# Patient Record
Sex: MALE | Race: WHITE | NOT HISPANIC OR LATINO
[De-identification: names, ages, dates, MRNs, and addresses within clinical notes are randomized per-mention and may not be internally consistent; named-entity substitution may affect disease eponyms.]

---

## 2017-03-08 ENCOUNTER — APPOINTMENT (OUTPATIENT)
Dept: INTERNAL MEDICINE | Facility: CLINIC | Age: 47
End: 2017-03-08

## 2017-03-08 VITALS — SYSTOLIC BLOOD PRESSURE: 130 MMHG | DIASTOLIC BLOOD PRESSURE: 88 MMHG

## 2017-03-08 VITALS
HEIGHT: 72 IN | OXYGEN SATURATION: 96 % | DIASTOLIC BLOOD PRESSURE: 90 MMHG | HEART RATE: 75 BPM | RESPIRATION RATE: 14 BRPM | BODY MASS INDEX: 42.66 KG/M2 | WEIGHT: 315 LBS | SYSTOLIC BLOOD PRESSURE: 130 MMHG

## 2017-03-10 ENCOUNTER — RX RENEWAL (OUTPATIENT)
Age: 47
End: 2017-03-10

## 2017-03-10 DIAGNOSIS — E55.9 VITAMIN D DEFICIENCY, UNSPECIFIED: ICD-10-CM

## 2017-03-10 DIAGNOSIS — N40.0 BENIGN PROSTATIC HYPERPLASIA WITHOUT LOWER URINARY TRACT SYMPMS: ICD-10-CM

## 2017-03-10 LAB
25(OH)D3 SERPL-MCNC: 14.5 NG/ML
ALBUMIN SERPL ELPH-MCNC: 4.1 G/DL
ALP BLD-CCNC: 65 U/L
ALT SERPL-CCNC: 35 U/L
ANION GAP SERPL CALC-SCNC: 17 MMOL/L
AST SERPL-CCNC: 25 U/L
BASOPHILS # BLD AUTO: 0.05 K/UL
BASOPHILS NFR BLD AUTO: 0.4 %
BILIRUB SERPL-MCNC: 0.9 MG/DL
BUN SERPL-MCNC: 28 MG/DL
CALCIUM SERPL-MCNC: 9.4 MG/DL
CHLORIDE SERPL-SCNC: 97 MMOL/L
CHOLEST SERPL-MCNC: 238 MG/DL
CHOLEST/HDLC SERPL: 5.4 RATIO
CK SERPL-CCNC: 184 U/L
CO2 SERPL-SCNC: 28 MMOL/L
CREAT SERPL-MCNC: 1.47 MG/DL
EOSINOPHIL # BLD AUTO: 0.28 K/UL
EOSINOPHIL NFR BLD AUTO: 2.5 %
GLUCOSE SERPL-MCNC: 84 MG/DL
HBA1C MFR BLD HPLC: 5.6 %
HCT VFR BLD CALC: 46.3 %
HDLC SERPL-MCNC: 44 MG/DL
HGB BLD-MCNC: 15.4 G/DL
IMM GRANULOCYTES NFR BLD AUTO: 0.2 %
LDLC SERPL CALC-MCNC: 160 MG/DL
LYMPHOCYTES # BLD AUTO: 3.01 K/UL
LYMPHOCYTES NFR BLD AUTO: 26.6 %
MAN DIFF?: NORMAL
MCHC RBC-ENTMCNC: 29.2 PG
MCHC RBC-ENTMCNC: 33.3 GM/DL
MCV RBC AUTO: 87.9 FL
MONOCYTES # BLD AUTO: 0.78 K/UL
MONOCYTES NFR BLD AUTO: 6.9 %
NEUTROPHILS # BLD AUTO: 7.16 K/UL
NEUTROPHILS NFR BLD AUTO: 63.4 %
PLATELET # BLD AUTO: 280 K/UL
POTASSIUM SERPL-SCNC: 3.8 MMOL/L
PROT SERPL-MCNC: 7 G/DL
PSA SERPL-MCNC: 2.13 NG/ML
RBC # BLD: 5.27 M/UL
RBC # FLD: 13.6 %
SODIUM SERPL-SCNC: 142 MMOL/L
TRIGL SERPL-MCNC: 168 MG/DL
TSH SERPL-ACNC: 3.78 UIU/ML
WBC # FLD AUTO: 11.3 K/UL

## 2017-04-26 ENCOUNTER — MEDICATION RENEWAL (OUTPATIENT)
Age: 47
End: 2017-04-26

## 2017-06-15 ENCOUNTER — APPOINTMENT (OUTPATIENT)
Dept: INTERNAL MEDICINE | Facility: CLINIC | Age: 47
End: 2017-06-15

## 2017-06-15 VITALS
HEIGHT: 72 IN | BODY MASS INDEX: 42.66 KG/M2 | TEMPERATURE: 98.6 F | DIASTOLIC BLOOD PRESSURE: 70 MMHG | WEIGHT: 315 LBS | SYSTOLIC BLOOD PRESSURE: 130 MMHG | OXYGEN SATURATION: 98 % | HEART RATE: 77 BPM | RESPIRATION RATE: 14 BRPM

## 2017-06-15 DIAGNOSIS — N52.9 MALE ERECTILE DYSFUNCTION, UNSPECIFIED: ICD-10-CM

## 2017-06-15 RX ORDER — AZITHROMYCIN 250 MG/1
250 TABLET, FILM COATED ORAL
Qty: 6 | Refills: 0 | Status: DISCONTINUED | COMMUNITY
Start: 2017-04-15

## 2017-11-09 ENCOUNTER — RX RENEWAL (OUTPATIENT)
Age: 47
End: 2017-11-09

## 2017-11-20 ENCOUNTER — RX RENEWAL (OUTPATIENT)
Age: 47
End: 2017-11-20

## 2018-01-17 ENCOUNTER — APPOINTMENT (OUTPATIENT)
Dept: INTERNAL MEDICINE | Facility: CLINIC | Age: 48
End: 2018-01-17
Payer: SELF-PAY

## 2018-01-17 VITALS
DIASTOLIC BLOOD PRESSURE: 70 MMHG | HEART RATE: 79 BPM | SYSTOLIC BLOOD PRESSURE: 138 MMHG | TEMPERATURE: 98.4 F | OXYGEN SATURATION: 96 % | RESPIRATION RATE: 14 BRPM | HEIGHT: 72 IN

## 2018-01-17 LAB
BASOPHILS # BLD AUTO: 0.06 K/UL
BASOPHILS NFR BLD AUTO: 0.6 %
EOSINOPHIL # BLD AUTO: 0.4 K/UL
EOSINOPHIL NFR BLD AUTO: 3.9 %
HCT VFR BLD CALC: 48.1 %
HGB BLD-MCNC: 16.3 G/DL
IMM GRANULOCYTES NFR BLD AUTO: 0.4 %
LYMPHOCYTES # BLD AUTO: 2.71 K/UL
LYMPHOCYTES NFR BLD AUTO: 26.3 %
MAN DIFF?: NORMAL
MCHC RBC-ENTMCNC: 29.5 PG
MCHC RBC-ENTMCNC: 33.9 GM/DL
MCV RBC AUTO: 87.1 FL
MONOCYTES # BLD AUTO: 0.64 K/UL
MONOCYTES NFR BLD AUTO: 6.2 %
NEUTROPHILS # BLD AUTO: 6.46 K/UL
NEUTROPHILS NFR BLD AUTO: 62.6 %
PLATELET # BLD AUTO: 242 K/UL
RBC # BLD: 5.52 M/UL
RBC # FLD: 13.4 %
WBC # FLD AUTO: 10.31 K/UL

## 2018-01-17 PROCEDURE — 99214 OFFICE O/P EST MOD 30 MIN: CPT | Mod: 25

## 2018-01-17 PROCEDURE — 36415 COLL VENOUS BLD VENIPUNCTURE: CPT

## 2018-01-24 LAB
25(OH)D3 SERPL-MCNC: 24.1 NG/ML
ALBUMIN SERPL ELPH-MCNC: 3.9 G/DL
ALP BLD-CCNC: 70 U/L
ALT SERPL-CCNC: 26 U/L
ANION GAP SERPL CALC-SCNC: 15 MMOL/L
AST SERPL-CCNC: 26 U/L
BILIRUB SERPL-MCNC: 0.8 MG/DL
BUN SERPL-MCNC: 23 MG/DL
CALCIUM SERPL-MCNC: 9.9 MG/DL
CHLORIDE SERPL-SCNC: 100 MMOL/L
CHOLEST SERPL-MCNC: 253 MG/DL
CHOLEST/HDLC SERPL: 5.1 RATIO
CK SERPL-CCNC: 157 U/L
CO2 SERPL-SCNC: 27 MMOL/L
CREAT SERPL-MCNC: 1.44 MG/DL
GLUCOSE SERPL-MCNC: 98 MG/DL
HBA1C MFR BLD HPLC: 5.4 %
HDLC SERPL-MCNC: 50 MG/DL
LDLC SERPL CALC-MCNC: 174 MG/DL
POTASSIUM SERPL-SCNC: 3.9 MMOL/L
PROT SERPL-MCNC: 8 G/DL
PSA SERPL-MCNC: 2.08 NG/ML
SODIUM SERPL-SCNC: 142 MMOL/L
TRIGL SERPL-MCNC: 144 MG/DL
TSH SERPL-ACNC: 3.45 UIU/ML
URATE SERPL-MCNC: 8.9 MG/DL

## 2018-10-21 ENCOUNTER — RX RENEWAL (OUTPATIENT)
Age: 48
End: 2018-10-21

## 2018-10-31 ENCOUNTER — RX RENEWAL (OUTPATIENT)
Age: 48
End: 2018-10-31

## 2018-11-08 ENCOUNTER — EMERGENCY (EMERGENCY)
Facility: HOSPITAL | Age: 48
LOS: 1 days | Discharge: HOME | End: 2018-11-08
Attending: EMERGENCY MEDICINE
Payer: COMMERCIAL

## 2018-11-08 ENCOUNTER — APPOINTMENT (OUTPATIENT)
Dept: INTERNAL MEDICINE | Facility: CLINIC | Age: 48
End: 2018-11-08
Payer: COMMERCIAL

## 2018-11-08 VITALS
SYSTOLIC BLOOD PRESSURE: 144 MMHG | RESPIRATION RATE: 16 BRPM | TEMPERATURE: 97.9 F | HEART RATE: 82 BPM | OXYGEN SATURATION: 96 % | DIASTOLIC BLOOD PRESSURE: 86 MMHG

## 2018-11-08 VITALS
DIASTOLIC BLOOD PRESSURE: 83 MMHG | OXYGEN SATURATION: 98 % | RESPIRATION RATE: 15 BRPM | SYSTOLIC BLOOD PRESSURE: 119 MMHG | TEMPERATURE: 98 F | HEART RATE: 70 BPM

## 2018-11-08 VITALS
RESPIRATION RATE: 16 BRPM | SYSTOLIC BLOOD PRESSURE: 112 MMHG | DIASTOLIC BLOOD PRESSURE: 80 MMHG | HEART RATE: 75 BPM | HEIGHT: 72 IN | OXYGEN SATURATION: 96 % | WEIGHT: 315 LBS | TEMPERATURE: 98 F

## 2018-11-08 DIAGNOSIS — Z98.89 OTHER SPECIFIED POSTPROCEDURAL STATES: Chronic | ICD-10-CM

## 2018-11-08 DIAGNOSIS — R20.0 ANESTHESIA OF SKIN: ICD-10-CM

## 2018-11-08 DIAGNOSIS — R29.898 OTHER SYMPTOMS AND SIGNS INVOLVING THE MUSCULOSKELETAL SYSTEM: ICD-10-CM

## 2018-11-08 PROCEDURE — 99214 OFFICE O/P EST MOD 30 MIN: CPT

## 2018-11-08 PROCEDURE — 99284 EMERGENCY DEPT VISIT MOD MDM: CPT

## 2018-11-08 PROCEDURE — 72100 X-RAY EXAM L-S SPINE 2/3 VWS: CPT

## 2018-11-08 PROCEDURE — 99283 EMERGENCY DEPT VISIT LOW MDM: CPT | Mod: 25

## 2018-11-08 PROCEDURE — 72100 X-RAY EXAM L-S SPINE 2/3 VWS: CPT | Mod: 26

## 2018-11-08 RX ORDER — LIDOCAINE 4 G/100G
1 CREAM TOPICAL ONCE
Qty: 0 | Refills: 0 | Status: COMPLETED | OUTPATIENT
Start: 2018-11-08 | End: 2018-11-08

## 2018-11-08 RX ORDER — IBUPROFEN 200 MG
600 TABLET ORAL ONCE
Qty: 0 | Refills: 0 | Status: COMPLETED | OUTPATIENT
Start: 2018-11-08 | End: 2018-11-08

## 2018-11-08 RX ORDER — ACETAMINOPHEN 500 MG
650 TABLET ORAL ONCE
Qty: 0 | Refills: 0 | Status: COMPLETED | OUTPATIENT
Start: 2018-11-08 | End: 2018-11-08

## 2018-11-08 RX ADMIN — Medication 60 MILLIGRAM(S): at 18:22

## 2018-11-08 RX ADMIN — LIDOCAINE 1 PATCH: 4 CREAM TOPICAL at 18:22

## 2018-11-08 RX ADMIN — Medication 650 MILLIGRAM(S): at 18:22

## 2018-11-08 RX ADMIN — Medication 600 MILLIGRAM(S): at 18:22

## 2018-11-08 NOTE — ED ADULT NURSE NOTE - OBJECTIVE STATEMENT
Assumed patient care @ 1650. Pt received sitting on stretcher in no apparent distress. Pt AOx3 C/O buttocks pain radiating to the b/l legs/toes x a few days, he reports being sent by Dr. Ledesma for a MRI. Patient seen ambulated with steady gait but slightly wobbly. Lungs clear to ausculation, respirations even unlabored. Skin warm, dry, color appropriate for age and race. Assumed patient care @ 1650. Pt received sitting on stretcher in no apparent distress. Pt AOx3 C/O buttocks pain radiating to the b/l legs/toes x a few days, he reports being sent by Dr. Ledesma for a MRI. Patient seen ambulated with steady gait but slightly wobbly. More numbness to LLE but strength is equal b/l. Lungs clear to ausculation, respirations even unlabored. Skin warm, dry, color appropriate for age and race.

## 2018-11-08 NOTE — ED PROVIDER NOTE - PROGRESS NOTE DETAILS
pt counseled about his diet for obesity pt felt better, "looser" in the back and c increased mobility.  pt informed of ? L5 fracture and he will fu with Dr. Ledesma and Jannette for official xray report and fu evaluation. pt counseled about his diet for obesity and emergency indications for MRI of lumbar spine. pt felt better, "looser" in the back and c increased mobility.  pt informed of ? L5 fracture as preliminary report (official result from radiologist tomorrow) from me and he will fu with Dr. Ledesma and Jannette for official xray report and fu evaluation.

## 2018-11-08 NOTE — PHYSICAL EXAM

## 2018-11-08 NOTE — ED PROVIDER NOTE - CHPI ED SYMPTOMS NEG
no bowel dysfunction/no bladder dysfunction/no difficulty bearing weight no difficulty bearing weight/no bowel dysfunction/no bladder dysfunction/no fatigue/no motor function loss/no anorexia/no constipation

## 2018-11-08 NOTE — ED ADULT NURSE NOTE - PMH
Arthritis  Left Knee  Gout    Hypertension    MRSA (methicillin resistant staph aureus) culture positive  Nasal culutre from 9/30/14  Obesity    Osteoarthrosis, localized  Localized Primary - Lower Leg

## 2018-11-08 NOTE — ED PROVIDER NOTE - OBJECTIVE STATEMENT
Lower back pain 2 weeks only c movement. pt had numbness waist down intermittent.  pt ambulate s difficulty. Lower back pain 2 weeks only c movement. pt had numbness waist down intermittent.  pt ambulate s difficulty.  bl numbness, left  > right x 5 days Lower back pain 2 weeks only c movement. pt had numbness waist down intermittent.  pt ambulate s difficulty.  bl numbness, left  > right x 5 days.  no weakness, no urinary of fecal compliants.   no other complaint . Lower back pain 2 weeks only c movement. pt had numbness waist down intermittent.  pt ambulate s difficulty.  bl numbness, left  > right x 5 days.  no weakness, no urinary of fecal compliants.   pt was triaged earlier in [facility] ED, but left bc no mri there.  no other complaint .

## 2018-11-08 NOTE — ED ADULT NURSE NOTE - NSIMPLEMENTINTERV_GEN_ALL_ED
Implemented All Universal Safety Interventions:  [place] to call system. Call bell, personal items and telephone within reach. Instruct patient to call for assistance. Room bathroom lighting operational. Non-slip footwear when patient is off stretcher. Physically safe environment: no spills, clutter or unnecessary equipment. Stretcher in lowest position, wheels locked, appropriate side rails in place.

## 2018-11-08 NOTE — HISTORY OF PRESENT ILLNESS
[FreeTextEntry8] : Pt c/o numbness in both legs for 5 days. Has pain in bilateral buttocks. His legs don't feel right when he's walking.

## 2018-11-19 ENCOUNTER — APPOINTMENT (OUTPATIENT)
Dept: ORTHOPEDIC SURGERY | Facility: CLINIC | Age: 48
End: 2018-11-19
Payer: COMMERCIAL

## 2018-11-19 VITALS
DIASTOLIC BLOOD PRESSURE: 75 MMHG | SYSTOLIC BLOOD PRESSURE: 115 MMHG | HEART RATE: 72 BPM | HEIGHT: 72 IN | WEIGHT: 315 LBS | BODY MASS INDEX: 42.66 KG/M2

## 2018-11-19 DIAGNOSIS — M54.5 LOW BACK PAIN: ICD-10-CM

## 2018-11-19 PROCEDURE — 99204 OFFICE O/P NEW MOD 45 MIN: CPT

## 2018-11-23 ENCOUNTER — RX RENEWAL (OUTPATIENT)
Age: 48
End: 2018-11-23

## 2018-11-27 ENCOUNTER — RX RENEWAL (OUTPATIENT)
Age: 48
End: 2018-11-27

## 2018-12-31 ENCOUNTER — RX RENEWAL (OUTPATIENT)
Age: 48
End: 2018-12-31

## 2019-02-08 ENCOUNTER — RX RENEWAL (OUTPATIENT)
Age: 49
End: 2019-02-08

## 2019-03-29 ENCOUNTER — RX RENEWAL (OUTPATIENT)
Age: 49
End: 2019-03-29

## 2019-04-12 ENCOUNTER — RX RENEWAL (OUTPATIENT)
Age: 49
End: 2019-04-12

## 2019-04-28 ENCOUNTER — RX RENEWAL (OUTPATIENT)
Age: 49
End: 2019-04-28

## 2019-05-07 ENCOUNTER — RX RENEWAL (OUTPATIENT)
Age: 49
End: 2019-05-07

## 2019-05-13 ENCOUNTER — RX RENEWAL (OUTPATIENT)
Age: 49
End: 2019-05-13

## 2019-05-23 ENCOUNTER — RX RENEWAL (OUTPATIENT)
Age: 49
End: 2019-05-23

## 2019-05-30 ENCOUNTER — RX RENEWAL (OUTPATIENT)
Age: 49
End: 2019-05-30

## 2019-06-18 ENCOUNTER — RX RENEWAL (OUTPATIENT)
Age: 49
End: 2019-06-18

## 2019-06-28 ENCOUNTER — RX RENEWAL (OUTPATIENT)
Age: 49
End: 2019-06-28

## 2019-07-02 ENCOUNTER — RX RENEWAL (OUTPATIENT)
Age: 49
End: 2019-07-02

## 2019-07-18 ENCOUNTER — RX RENEWAL (OUTPATIENT)
Age: 49
End: 2019-07-18

## 2019-07-30 ENCOUNTER — RX RENEWAL (OUTPATIENT)
Age: 49
End: 2019-07-30

## 2019-08-14 ENCOUNTER — APPOINTMENT (OUTPATIENT)
Dept: INTERNAL MEDICINE | Facility: CLINIC | Age: 49
End: 2019-08-14
Payer: COMMERCIAL

## 2019-08-14 VITALS
HEIGHT: 72 IN | RESPIRATION RATE: 14 BRPM | HEART RATE: 74 BPM | DIASTOLIC BLOOD PRESSURE: 82 MMHG | SYSTOLIC BLOOD PRESSURE: 140 MMHG | OXYGEN SATURATION: 93 % | TEMPERATURE: 98.3 F

## 2019-08-14 DIAGNOSIS — E78.00 PURE HYPERCHOLESTEROLEMIA, UNSPECIFIED: ICD-10-CM

## 2019-08-14 PROCEDURE — 36415 COLL VENOUS BLD VENIPUNCTURE: CPT

## 2019-08-14 PROCEDURE — 99214 OFFICE O/P EST MOD 30 MIN: CPT | Mod: 25

## 2019-08-14 NOTE — PHYSICAL EXAM
[No Acute Distress] : no acute distress [Well Nourished] : well nourished [Well Developed] : well developed [Well-Appearing] : well-appearing [Normal Sclera/Conjunctiva] : normal sclera/conjunctiva [Normal Voice/Communication] : normal voice/communication [PERRL] : pupils equal round and reactive to light [EOMI] : extraocular movements intact [Normal Outer Ear/Nose] : the outer ears and nose were normal in appearance [Normal Oropharynx] : the oropharynx was normal [No JVD] : no jugular venous distention [No Lymphadenopathy] : no lymphadenopathy [Supple] : supple [Thyroid Normal, No Nodules] : the thyroid was normal and there were no nodules present [No Respiratory Distress] : no respiratory distress  [No Accessory Muscle Use] : no accessory muscle use [Clear to Auscultation] : lungs were clear to auscultation bilaterally [Normal Rate] : normal rate  [Regular Rhythm] : with a regular rhythm [Normal S1, S2] : normal S1 and S2 [No Carotid Bruits] : no carotid bruits [No Murmur] : no murmur heard [No Varicosities] : no varicosities [No Abdominal Bruit] : a ~M bruit was not heard ~T in the abdomen [Pedal Pulses Present] : the pedal pulses are present [No Edema] : there was no peripheral edema [No Extremity Clubbing/Cyanosis] : no extremity clubbing/cyanosis [No Palpable Aorta] : no palpable aorta [Soft] : abdomen soft [Non Tender] : non-tender [Non-distended] : non-distended [No Masses] : no abdominal mass palpated [Normal Bowel Sounds] : normal bowel sounds [No HSM] : no HSM [Normal Supraclavicular Nodes] : no supraclavicular lymphadenopathy [Normal Posterior Cervical Nodes] : no posterior cervical lymphadenopathy [No CVA Tenderness] : no CVA  tenderness [No Spinal Tenderness] : no spinal tenderness [Normal Anterior Cervical Nodes] : no anterior cervical lymphadenopathy [Grossly Normal Strength/Tone] : grossly normal strength/tone [No Joint Swelling] : no joint swelling [No Rash] : no rash [Coordination Grossly Intact] : coordination grossly intact [No Focal Deficits] : no focal deficits [Deep Tendon Reflexes (DTR)] : deep tendon reflexes were 2+ and symmetric [Normal Gait] : normal gait [Memory Grossly Normal] : memory grossly normal [Speech Grossly Normal] : speech grossly normal [Normal Affect] : the affect was normal [Alert and Oriented x3] : oriented to person, place, and time [Normal Mood] : the mood was normal [Normal Insight/Judgement] : insight and judgment were intact

## 2019-08-14 NOTE — HEALTH RISK ASSESSMENT
[Monthly or less (1 pt)] : Monthly or less (1 point) [Yes] : Yes [1 or 2 (0 pts)] : 1 or 2 (0 points) [No] : In the past 12 months have you used drugs other than those required for medical reasons? No [No falls in past year] : Patient reported no falls in the past year [0] : 2) Feeling down, depressed, or hopeless: Not at all (0) [] : No

## 2019-08-14 NOTE — HISTORY OF PRESENT ILLNESS
[FreeTextEntry1] : RAn out of medications for 24 hours\par here for follow up for his cholesterol and HTN\par feels well

## 2019-08-14 NOTE — COUNSELING
[Potential consequences of obesity discussed] : Potential consequences of obesity discussed [Benefits of weight loss discussed] : Benefits of weight loss discussed [Decrease Portions] : decrease portions [Encouraged to increase physical activity] : Encouraged to increase physical activity

## 2019-08-15 LAB
25(OH)D3 SERPL-MCNC: 20.5 NG/ML
ALBUMIN SERPL ELPH-MCNC: 4.1 G/DL
ALP BLD-CCNC: 75 U/L
ALT SERPL-CCNC: 18 U/L
ANION GAP SERPL CALC-SCNC: 16 MMOL/L
AST SERPL-CCNC: 14 U/L
BASOPHILS # BLD AUTO: 0.11 K/UL
BASOPHILS NFR BLD AUTO: 1 %
BILIRUB SERPL-MCNC: 0.5 MG/DL
BUN SERPL-MCNC: 26 MG/DL
CALCIUM SERPL-MCNC: 9.1 MG/DL
CHLORIDE SERPL-SCNC: 99 MMOL/L
CHOLEST SERPL-MCNC: 201 MG/DL
CHOLEST/HDLC SERPL: 4.4 RATIO
CK SERPL-CCNC: 183 U/L
CO2 SERPL-SCNC: 29 MMOL/L
CREAT SERPL-MCNC: 1.55 MG/DL
EOSINOPHIL # BLD AUTO: 0.49 K/UL
EOSINOPHIL NFR BLD AUTO: 4.3 %
ESTIMATED AVERAGE GLUCOSE: 111 MG/DL
GLUCOSE SERPL-MCNC: 99 MG/DL
HBA1C MFR BLD HPLC: 5.5 %
HCT VFR BLD CALC: 47.3 %
HDLC SERPL-MCNC: 46 MG/DL
HGB BLD-MCNC: 15.1 G/DL
IMM GRANULOCYTES NFR BLD AUTO: 0.4 %
LDLC SERPL CALC-MCNC: 134 MG/DL
LYMPHOCYTES # BLD AUTO: 2.54 K/UL
LYMPHOCYTES NFR BLD AUTO: 22.3 %
MAN DIFF?: NORMAL
MCHC RBC-ENTMCNC: 29.3 PG
MCHC RBC-ENTMCNC: 31.9 GM/DL
MCV RBC AUTO: 91.8 FL
MONOCYTES # BLD AUTO: 0.94 K/UL
MONOCYTES NFR BLD AUTO: 8.2 %
NEUTROPHILS # BLD AUTO: 7.29 K/UL
NEUTROPHILS NFR BLD AUTO: 63.8 %
PLATELET # BLD AUTO: 263 K/UL
POTASSIUM SERPL-SCNC: 3.6 MMOL/L
PROT SERPL-MCNC: 7 G/DL
PSA SERPL-MCNC: 2.54 NG/ML
RBC # BLD: 5.15 M/UL
RBC # FLD: 13.2 %
SODIUM SERPL-SCNC: 144 MMOL/L
TRIGL SERPL-MCNC: 104 MG/DL
TSH SERPL-ACNC: 4.12 UIU/ML
URATE SERPL-MCNC: 10.6 MG/DL
WBC # FLD AUTO: 11.41 K/UL

## 2019-08-15 RX ORDER — INDOMETHACIN 50 MG/1
50 CAPSULE ORAL
Qty: 14 | Refills: 0 | Status: DISCONTINUED | COMMUNITY
Start: 2016-10-29 | End: 2019-08-15

## 2019-11-27 ENCOUNTER — RX RENEWAL (OUTPATIENT)
Age: 49
End: 2019-11-27

## 2019-12-18 ENCOUNTER — RX RENEWAL (OUTPATIENT)
Age: 49
End: 2019-12-18

## 2020-03-05 ENCOUNTER — RX RENEWAL (OUTPATIENT)
Age: 50
End: 2020-03-05

## 2020-03-13 ENCOUNTER — RX RENEWAL (OUTPATIENT)
Age: 50
End: 2020-03-13

## 2020-04-13 ENCOUNTER — RX RENEWAL (OUTPATIENT)
Age: 50
End: 2020-04-13

## 2020-06-18 ENCOUNTER — RX RENEWAL (OUTPATIENT)
Age: 50
End: 2020-06-18

## 2020-06-19 ENCOUNTER — RX RENEWAL (OUTPATIENT)
Age: 50
End: 2020-06-19

## 2020-08-22 ENCOUNTER — RX RENEWAL (OUTPATIENT)
Age: 50
End: 2020-08-22

## 2020-09-17 ENCOUNTER — LABORATORY RESULT (OUTPATIENT)
Age: 50
End: 2020-09-17

## 2020-09-17 ENCOUNTER — NON-APPOINTMENT (OUTPATIENT)
Age: 50
End: 2020-09-17

## 2020-09-17 ENCOUNTER — APPOINTMENT (OUTPATIENT)
Dept: INTERNAL MEDICINE | Facility: CLINIC | Age: 50
End: 2020-09-17
Payer: COMMERCIAL

## 2020-09-17 VITALS
TEMPERATURE: 97.8 F | SYSTOLIC BLOOD PRESSURE: 138 MMHG | DIASTOLIC BLOOD PRESSURE: 84 MMHG | RESPIRATION RATE: 16 BRPM | OXYGEN SATURATION: 95 % | HEIGHT: 72 IN | BODY MASS INDEX: 42.66 KG/M2 | HEART RATE: 80 BPM | WEIGHT: 315 LBS

## 2020-09-17 DIAGNOSIS — Z00.00 ENCOUNTER FOR GENERAL ADULT MEDICAL EXAMINATION W/OUT ABNORMAL FINDINGS: ICD-10-CM

## 2020-09-17 DIAGNOSIS — E66.01 MORBID (SEVERE) OBESITY DUE TO EXCESS CALORIES: ICD-10-CM

## 2020-09-17 DIAGNOSIS — I10 ESSENTIAL (PRIMARY) HYPERTENSION: ICD-10-CM

## 2020-09-17 DIAGNOSIS — H91.92 UNSPECIFIED HEARING LOSS, LEFT EAR: ICD-10-CM

## 2020-09-17 DIAGNOSIS — Z23 ENCOUNTER FOR IMMUNIZATION: ICD-10-CM

## 2020-09-17 PROCEDURE — 93000 ELECTROCARDIOGRAM COMPLETE: CPT

## 2020-09-17 PROCEDURE — 99396 PREV VISIT EST AGE 40-64: CPT | Mod: 25

## 2020-09-17 PROCEDURE — G0008: CPT

## 2020-09-17 PROCEDURE — 90686 IIV4 VACC NO PRSV 0.5 ML IM: CPT

## 2020-09-17 NOTE — PHYSICAL EXAM
[No Acute Distress] : no acute distress [Well Nourished] : well nourished [Well Developed] : well developed [Well-Appearing] : well-appearing [Normal Sclera/Conjunctiva] : normal sclera/conjunctiva [PERRL] : pupils equal round and reactive to light [EOMI] : extraocular movements intact [Normal Outer Ear/Nose] : the outer ears and nose were normal in appearance [Normal Oropharynx] : the oropharynx was normal [No JVD] : no jugular venous distention [No Lymphadenopathy] : no lymphadenopathy [Supple] : supple [Thyroid Normal, No Nodules] : the thyroid was normal and there were no nodules present [No Respiratory Distress] : no respiratory distress  [No Accessory Muscle Use] : no accessory muscle use [Clear to Auscultation] : lungs were clear to auscultation bilaterally [Normal Rate] : normal rate  [Regular Rhythm] : with a regular rhythm [Normal S1, S2] : normal S1 and S2 [No Murmur] : no murmur heard [No Carotid Bruits] : no carotid bruits [No Abdominal Bruit] : a ~M bruit was not heard ~T in the abdomen [No Varicosities] : no varicosities [Pedal Pulses Present] : the pedal pulses are present [No Edema] : there was no peripheral edema [No Palpable Aorta] : no palpable aorta [No Extremity Clubbing/Cyanosis] : no extremity clubbing/cyanosis [Soft] : abdomen soft [Non Tender] : non-tender [Non-distended] : non-distended [No Masses] : no abdominal mass palpated [No HSM] : no HSM [Normal Bowel Sounds] : normal bowel sounds [Normal Posterior Cervical Nodes] : no posterior cervical lymphadenopathy [Normal Anterior Cervical Nodes] : no anterior cervical lymphadenopathy [No CVA Tenderness] : no CVA  tenderness [No Spinal Tenderness] : no spinal tenderness [No Joint Swelling] : no joint swelling [Grossly Normal Strength/Tone] : grossly normal strength/tone [No Rash] : no rash [Coordination Grossly Intact] : coordination grossly intact [No Focal Deficits] : no focal deficits [Normal Gait] : normal gait [Normal Affect] : the affect was normal [Normal Insight/Judgement] : insight and judgment were intact [de-identified] : Decreased hearing of left ear

## 2020-09-17 NOTE — HEALTH RISK ASSESSMENT
[Excellent] : ~his/her~  mood as  excellent [Yes] : Yes [] : No [de-identified] : Rare alcohol intake

## 2020-09-17 NOTE — PLAN
[FreeTextEntry1] : Pt understands that he must lose weight through improved exercise and diet,\par Pt will see Dr. Pope, Cardiology, for a check-up\par Pt will see Dr. Guzman, Sleep Medicine, for management of TERENCE\par Screening colonoscopy will be addressed after the above work-ups are completed\par See Dr. Lawson, ENT, regarding decreased hearing of left ear

## 2020-09-17 NOTE — HISTORY OF PRESENT ILLNESS
[FreeTextEntry1] : Here for annual physical.\par Feeling well. [de-identified] : Doesn't smoke. Rare alcohol intake.\par Doesn't exercise much.\par Hasn't had a colonoscopy

## 2020-09-25 LAB
ALBUMIN SERPL ELPH-MCNC: 4.2 G/DL
ALP BLD-CCNC: 79 U/L
ALT SERPL-CCNC: 17 U/L
ANION GAP SERPL CALC-SCNC: 16 MMOL/L
APPEARANCE: CLEAR
AST SERPL-CCNC: 19 U/L
BASOPHILS # BLD AUTO: 0.09 K/UL
BASOPHILS NFR BLD AUTO: 1 %
BILIRUB SERPL-MCNC: 0.8 MG/DL
BILIRUBIN URINE: NEGATIVE
BLOOD URINE: ABNORMAL
BUN SERPL-MCNC: 27 MG/DL
CALCIUM SERPL-MCNC: 9 MG/DL
CHLORIDE SERPL-SCNC: 98 MMOL/L
CHOLEST SERPL-MCNC: 163 MG/DL
CHOLEST/HDLC SERPL: 4 RATIO
CO2 SERPL-SCNC: 30 MMOL/L
COLOR: NORMAL
CREAT SERPL-MCNC: 1.46 MG/DL
EOSINOPHIL # BLD AUTO: 0.73 K/UL
EOSINOPHIL NFR BLD AUTO: 8.2 %
ESTIMATED AVERAGE GLUCOSE: 117 MG/DL
FOLATE SERPL-MCNC: 7.5 NG/ML
GLUCOSE QUALITATIVE U: NEGATIVE
GLUCOSE SERPL-MCNC: 89 MG/DL
HBA1C MFR BLD HPLC: 5.7 %
HCT VFR BLD CALC: 45.6 %
HDLC SERPL-MCNC: 41 MG/DL
HGB BLD-MCNC: 14.5 G/DL
IMM GRANULOCYTES NFR BLD AUTO: 0.2 %
KETONES URINE: NEGATIVE
LDLC SERPL CALC-MCNC: 98 MG/DL
LEUKOCYTE ESTERASE URINE: NEGATIVE
LYMPHOCYTES # BLD AUTO: 1.99 K/UL
LYMPHOCYTES NFR BLD AUTO: 22.3 %
MAN DIFF?: NORMAL
MCHC RBC-ENTMCNC: 27.6 PG
MCHC RBC-ENTMCNC: 31.8 GM/DL
MCV RBC AUTO: 86.9 FL
MONOCYTES # BLD AUTO: 0.8 K/UL
MONOCYTES NFR BLD AUTO: 8.9 %
NEUTROPHILS # BLD AUTO: 5.31 K/UL
NEUTROPHILS NFR BLD AUTO: 59.4 %
NITRITE URINE: NEGATIVE
PH URINE: 6.5
PLATELET # BLD AUTO: 260 K/UL
POTASSIUM SERPL-SCNC: 4 MMOL/L
PROT SERPL-MCNC: 6.8 G/DL
PROTEIN URINE: ABNORMAL
PSA SERPL-MCNC: 2.56 NG/ML
RBC # BLD: 5.25 M/UL
RBC # FLD: 14.2 %
SARS-COV-2 IGG SERPL IA-ACNC: 0.09 INDEX
SARS-COV-2 IGG SERPL QL IA: NEGATIVE
SODIUM SERPL-SCNC: 143 MMOL/L
SPECIFIC GRAVITY URINE: 1.01
TRIGL SERPL-MCNC: 123 MG/DL
TSH SERPL-ACNC: 3.38 UIU/ML
UROBILINOGEN URINE: NORMAL
VIT B12 SERPL-MCNC: 404 PG/ML
WBC # FLD AUTO: 8.94 K/UL

## 2020-09-27 ENCOUNTER — RX RENEWAL (OUTPATIENT)
Age: 50
End: 2020-09-27

## 2020-09-28 ENCOUNTER — APPOINTMENT (OUTPATIENT)
Dept: OTOLARYNGOLOGY | Facility: CLINIC | Age: 50
End: 2020-09-28

## 2020-09-30 RX ORDER — TADALAFIL 20 MG/1
20 TABLET ORAL
Qty: 6 | Refills: 5 | Status: ACTIVE | COMMUNITY
Start: 2017-06-15 | End: 1900-01-01

## 2020-10-08 ENCOUNTER — NON-APPOINTMENT (OUTPATIENT)
Age: 50
End: 2020-10-08

## 2020-10-08 ENCOUNTER — APPOINTMENT (OUTPATIENT)
Dept: CARDIOLOGY | Facility: CLINIC | Age: 50
End: 2020-10-08
Payer: COMMERCIAL

## 2020-10-08 VITALS
OXYGEN SATURATION: 94 % | HEART RATE: 67 BPM | DIASTOLIC BLOOD PRESSURE: 81 MMHG | SYSTOLIC BLOOD PRESSURE: 123 MMHG | BODY MASS INDEX: 47.47 KG/M2 | TEMPERATURE: 97.7 F | RESPIRATION RATE: 20 BRPM | WEIGHT: 315 LBS

## 2020-10-08 DIAGNOSIS — M17.10 UNILATERAL PRIMARY OSTEOARTHRITIS, UNSPECIFIED KNEE: ICD-10-CM

## 2020-10-08 DIAGNOSIS — R06.00 DYSPNEA, UNSPECIFIED: ICD-10-CM

## 2020-10-08 PROCEDURE — 93306 TTE W/DOPPLER COMPLETE: CPT

## 2020-10-08 PROCEDURE — 99203 OFFICE O/P NEW LOW 30 MIN: CPT

## 2020-10-08 PROCEDURE — 93000 ELECTROCARDIOGRAM COMPLETE: CPT

## 2020-10-08 NOTE — DISCUSSION/SUMMARY
[Hyperlipidemia] : hyperlipidemia [Family History of CAD] : family history of CAD [Diet Modification] : diet modification [Exercise] : exercise [Weight Loss] : weight loss [Hypertension] : hypertension [Responding to Treatment] : responding to treatment [Echocardiogram] : echocardiogram [None] : none

## 2020-10-08 NOTE — REASON FOR VISIT
[Consultation] : a consultation regarding [FreeTextEntry2] : Patient with positive cardiac risk factors for CAD presents for initial evaluation. pt admits to dyspnea up inclines, no change and has bad knees.

## 2020-10-08 NOTE — PHYSICAL EXAM
[General Appearance - Well Developed] : well developed [Normal Appearance] : normal appearance [Well Groomed] : well groomed [General Appearance - Well Nourished] : well nourished [No Deformities] : no deformities [General Appearance - In No Acute Distress] : no acute distress [Normal Conjunctiva] : the conjunctiva exhibited no abnormalities [Eyelids - No Xanthelasma] : the eyelids demonstrated no xanthelasmas [Normal Oral Mucosa] : normal oral mucosa [No Oral Pallor] : no oral pallor [No Oral Cyanosis] : no oral cyanosis [Normal Jugular Venous A Waves Present] : normal jugular venous A waves present [Normal Jugular Venous V Waves Present] : normal jugular venous V waves present [No Jugular Venous Reyes A Waves] : no jugular venous reyes A waves [Normal Rate] : normal [Normal S1] : normal S1 [Normal S2] : normal S2 [No Murmur] : no murmurs heard [2+] : left 2+ [No Abnormalities] : the abdominal aorta was not enlarged and no bruit was heard [No Pitting Edema] : no pitting edema present [Respiration, Rhythm And Depth] : normal respiratory rhythm and effort [Exaggerated Use Of Accessory Muscles For Inspiration] : no accessory muscle use [Auscultation Breath Sounds / Voice Sounds] : lungs were clear to auscultation bilaterally [Abdomen Soft] : soft [Abdomen Tenderness] : non-tender [Abdomen Mass (___ Cm)] : no abdominal mass palpated [Nail Clubbing] : no clubbing of the fingernails [Cyanosis, Localized] : no localized cyanosis [Petechial Hemorrhages (___cm)] : no petechial hemorrhages [Skin Color & Pigmentation] : normal skin color and pigmentation [] : no rash [No Venous Stasis] : no venous stasis [Skin Lesions] : no skin lesions [No Skin Ulcers] : no skin ulcer [No Xanthoma] : no  xanthoma was observed [Oriented To Time, Place, And Person] : oriented to person, place, and time [Affect] : the affect was normal [Mood] : the mood was normal [No Anxiety] : not feeling anxious [S3] : no S3 [S4] : no S4 [Right Carotid Bruit] : no bruit heard over the right carotid [Left Carotid Bruit] : no bruit heard over the left carotid [Right Femoral Bruit] : no bruit heard over the right femoral artery [Left Femoral Bruit] : no bruit heard over the left femoral artery [FreeTextEntry1] : pt is obese and has h/o TKR and chronic knee issues, cant walk on treadmill, failed before

## 2020-12-03 ENCOUNTER — APPOINTMENT (OUTPATIENT)
Dept: CARDIOLOGY | Facility: CLINIC | Age: 50
End: 2020-12-03
Payer: COMMERCIAL

## 2020-12-03 PROCEDURE — A9500: CPT

## 2020-12-03 PROCEDURE — 99072 ADDL SUPL MATRL&STAF TM PHE: CPT

## 2020-12-03 PROCEDURE — 78452 HT MUSCLE IMAGE SPECT MULT: CPT

## 2020-12-03 PROCEDURE — 93015 CV STRESS TEST SUPVJ I&R: CPT

## 2020-12-05 ENCOUNTER — RX RENEWAL (OUTPATIENT)
Age: 50
End: 2020-12-05

## 2020-12-07 ENCOUNTER — APPOINTMENT (OUTPATIENT)
Dept: CARDIOLOGY | Facility: CLINIC | Age: 50
End: 2020-12-07
Payer: COMMERCIAL

## 2020-12-07 PROCEDURE — ZZZZZ: CPT

## 2020-12-11 ENCOUNTER — APPOINTMENT (OUTPATIENT)
Dept: INTERNAL MEDICINE | Facility: CLINIC | Age: 50
End: 2020-12-11

## 2021-01-09 ENCOUNTER — RX RENEWAL (OUTPATIENT)
Age: 51
End: 2021-01-09

## 2021-01-20 DIAGNOSIS — I42.9 CARDIOMYOPATHY, UNSPECIFIED: ICD-10-CM

## 2021-01-25 ENCOUNTER — RX RENEWAL (OUTPATIENT)
Age: 51
End: 2021-01-25

## 2021-02-09 ENCOUNTER — RX RENEWAL (OUTPATIENT)
Age: 51
End: 2021-02-09

## 2021-02-09 RX ORDER — ATORVASTATIN CALCIUM 10 MG/1
10 TABLET, FILM COATED ORAL
Qty: 30 | Refills: 0 | Status: ACTIVE | COMMUNITY
Start: 2017-03-10 | End: 1900-01-01

## 2021-02-27 ENCOUNTER — RESULT REVIEW (OUTPATIENT)
Age: 51
End: 2021-02-27

## 2021-03-17 ENCOUNTER — TRANSCRIPTION ENCOUNTER (OUTPATIENT)
Age: 51
End: 2021-03-17

## 2021-10-15 ENCOUNTER — EMERGENCY (EMERGENCY)
Facility: HOSPITAL | Age: 51
LOS: 1 days | Discharge: HOME | End: 2021-10-15
Attending: EMERGENCY MEDICINE | Admitting: EMERGENCY MEDICINE
Payer: COMMERCIAL

## 2021-10-15 VITALS
OXYGEN SATURATION: 96 % | DIASTOLIC BLOOD PRESSURE: 87 MMHG | SYSTOLIC BLOOD PRESSURE: 148 MMHG | HEART RATE: 66 BPM | RESPIRATION RATE: 17 BRPM

## 2021-10-15 VITALS
SYSTOLIC BLOOD PRESSURE: 153 MMHG | HEART RATE: 66 BPM | WEIGHT: 315 LBS | TEMPERATURE: 99 F | RESPIRATION RATE: 16 BRPM | OXYGEN SATURATION: 95 % | DIASTOLIC BLOOD PRESSURE: 89 MMHG | HEIGHT: 72 IN

## 2021-10-15 DIAGNOSIS — Z98.89 OTHER SPECIFIED POSTPROCEDURAL STATES: Chronic | ICD-10-CM

## 2021-10-15 LAB
ALBUMIN SERPL ELPH-MCNC: 3.1 G/DL — LOW (ref 3.3–5)
ALP SERPL-CCNC: 75 U/L — SIGNIFICANT CHANGE UP (ref 40–120)
ALT FLD-CCNC: 20 U/L — SIGNIFICANT CHANGE UP (ref 10–45)
ANION GAP SERPL CALC-SCNC: 8 MMOL/L — SIGNIFICANT CHANGE UP (ref 5–17)
AST SERPL-CCNC: 21 U/L — SIGNIFICANT CHANGE UP (ref 10–40)
BASOPHILS # BLD AUTO: 0.05 K/UL — SIGNIFICANT CHANGE UP (ref 0–0.2)
BASOPHILS NFR BLD AUTO: 0.5 % — SIGNIFICANT CHANGE UP (ref 0–2)
BILIRUB SERPL-MCNC: 0.5 MG/DL — SIGNIFICANT CHANGE UP (ref 0.2–1.2)
BUN SERPL-MCNC: 48 MG/DL — HIGH (ref 7–23)
CALCIUM SERPL-MCNC: 8.9 MG/DL — SIGNIFICANT CHANGE UP (ref 8.4–10.5)
CHLORIDE SERPL-SCNC: 102 MMOL/L — SIGNIFICANT CHANGE UP (ref 96–108)
CO2 SERPL-SCNC: 31 MMOL/L — SIGNIFICANT CHANGE UP (ref 22–31)
CREAT SERPL-MCNC: 2.18 MG/DL — HIGH (ref 0.5–1.3)
D DIMER BLD IA.RAPID-MCNC: 436 NG/ML DDU — HIGH
EOSINOPHIL # BLD AUTO: 0.32 K/UL — SIGNIFICANT CHANGE UP (ref 0–0.5)
EOSINOPHIL NFR BLD AUTO: 3.3 % — SIGNIFICANT CHANGE UP (ref 0–6)
GLUCOSE SERPL-MCNC: 112 MG/DL — HIGH (ref 70–99)
HCT VFR BLD CALC: 41.7 % — SIGNIFICANT CHANGE UP (ref 39–50)
HGB BLD-MCNC: 13.7 G/DL — SIGNIFICANT CHANGE UP (ref 13–17)
IMM GRANULOCYTES NFR BLD AUTO: 0.5 % — SIGNIFICANT CHANGE UP (ref 0–1.5)
LYMPHOCYTES # BLD AUTO: 1.85 K/UL — SIGNIFICANT CHANGE UP (ref 1–3.3)
LYMPHOCYTES # BLD AUTO: 19.1 % — SIGNIFICANT CHANGE UP (ref 13–44)
MAGNESIUM SERPL-MCNC: 1.6 MG/DL — SIGNIFICANT CHANGE UP (ref 1.6–2.6)
MCHC RBC-ENTMCNC: 28.1 PG — SIGNIFICANT CHANGE UP (ref 27–34)
MCHC RBC-ENTMCNC: 32.9 GM/DL — SIGNIFICANT CHANGE UP (ref 32–36)
MCV RBC AUTO: 85.5 FL — SIGNIFICANT CHANGE UP (ref 80–100)
MONOCYTES # BLD AUTO: 0.73 K/UL — SIGNIFICANT CHANGE UP (ref 0–0.9)
MONOCYTES NFR BLD AUTO: 7.5 % — SIGNIFICANT CHANGE UP (ref 2–14)
NEUTROPHILS # BLD AUTO: 6.71 K/UL — SIGNIFICANT CHANGE UP (ref 1.8–7.4)
NEUTROPHILS NFR BLD AUTO: 69.1 % — SIGNIFICANT CHANGE UP (ref 43–77)
NRBC # BLD: 0 /100 WBCS — SIGNIFICANT CHANGE UP (ref 0–0)
NT-PROBNP SERPL-SCNC: 543 PG/ML — HIGH (ref 0–300)
PLATELET # BLD AUTO: 288 K/UL — SIGNIFICANT CHANGE UP (ref 150–400)
POTASSIUM SERPL-MCNC: 3.4 MMOL/L — LOW (ref 3.5–5.3)
POTASSIUM SERPL-SCNC: 3.4 MMOL/L — LOW (ref 3.5–5.3)
PROT SERPL-MCNC: 7.5 G/DL — SIGNIFICANT CHANGE UP (ref 6–8.3)
RBC # BLD: 4.88 M/UL — SIGNIFICANT CHANGE UP (ref 4.2–5.8)
RBC # FLD: 13.2 % — SIGNIFICANT CHANGE UP (ref 10.3–14.5)
SODIUM SERPL-SCNC: 141 MMOL/L — SIGNIFICANT CHANGE UP (ref 135–145)
TROPONIN I SERPL-MCNC: <.017 NG/ML — LOW (ref 0.02–0.06)
WBC # BLD: 9.71 K/UL — SIGNIFICANT CHANGE UP (ref 3.8–10.5)
WBC # FLD AUTO: 9.71 K/UL — SIGNIFICANT CHANGE UP (ref 3.8–10.5)

## 2021-10-15 PROCEDURE — 71045 X-RAY EXAM CHEST 1 VIEW: CPT | Mod: 26

## 2021-10-15 PROCEDURE — 83735 ASSAY OF MAGNESIUM: CPT

## 2021-10-15 PROCEDURE — 71045 X-RAY EXAM CHEST 1 VIEW: CPT

## 2021-10-15 PROCEDURE — 85025 COMPLETE CBC W/AUTO DIFF WBC: CPT

## 2021-10-15 PROCEDURE — 99283 EMERGENCY DEPT VISIT LOW MDM: CPT | Mod: 25

## 2021-10-15 PROCEDURE — 83880 ASSAY OF NATRIURETIC PEPTIDE: CPT

## 2021-10-15 PROCEDURE — 84484 ASSAY OF TROPONIN QUANT: CPT

## 2021-10-15 PROCEDURE — 80053 COMPREHEN METABOLIC PANEL: CPT

## 2021-10-15 PROCEDURE — 93005 ELECTROCARDIOGRAM TRACING: CPT

## 2021-10-15 PROCEDURE — 36415 COLL VENOUS BLD VENIPUNCTURE: CPT

## 2021-10-15 PROCEDURE — 93010 ELECTROCARDIOGRAM REPORT: CPT

## 2021-10-15 PROCEDURE — 85379 FIBRIN DEGRADATION QUANT: CPT

## 2021-10-15 PROCEDURE — 99285 EMERGENCY DEPT VISIT HI MDM: CPT

## 2021-10-15 RX ORDER — LIDOCAINE 4 G/100G
1 CREAM TOPICAL ONCE
Refills: 0 | Status: COMPLETED | OUTPATIENT
Start: 2021-10-15 | End: 2021-10-15

## 2021-10-15 RX ORDER — SODIUM CHLORIDE 9 MG/ML
1000 INJECTION INTRAMUSCULAR; INTRAVENOUS; SUBCUTANEOUS ONCE
Refills: 0 | Status: COMPLETED | OUTPATIENT
Start: 2021-10-15 | End: 2021-10-15

## 2021-10-15 RX ORDER — OXYCODONE AND ACETAMINOPHEN 5; 325 MG/1; MG/1
1 TABLET ORAL ONCE
Refills: 0 | Status: DISCONTINUED | OUTPATIENT
Start: 2021-10-15 | End: 2021-10-15

## 2021-10-15 RX ORDER — OXYCODONE AND ACETAMINOPHEN 5; 325 MG/1; MG/1
1 TABLET ORAL
Qty: 15 | Refills: 0
Start: 2021-10-15 | End: 2021-10-19

## 2021-10-15 RX ADMIN — LIDOCAINE 1 PATCH: 4 CREAM TOPICAL at 15:43

## 2021-10-15 RX ADMIN — SODIUM CHLORIDE 1000 MILLILITER(S): 9 INJECTION INTRAMUSCULAR; INTRAVENOUS; SUBCUTANEOUS at 17:17

## 2021-10-15 RX ADMIN — OXYCODONE AND ACETAMINOPHEN 1 TABLET(S): 5; 325 TABLET ORAL at 17:05

## 2021-10-15 RX ADMIN — OXYCODONE AND ACETAMINOPHEN 1 TABLET(S): 5; 325 TABLET ORAL at 15:43

## 2021-10-15 NOTE — ED ADULT NURSE NOTE - NSICDXPASTSURGICALHX_GEN_ALL_CORE_FT
PAST SURGICAL HISTORY:  S/P knee surgery Left Knee Arthroscopic 29 years ago as teenager - then a second one around age 18

## 2021-10-15 NOTE — ED PROVIDER NOTE - PATIENT PORTAL LINK FT
You can access the FollowMyHealth Patient Portal offered by [facility] by registering at the following website: [email or web address]. By joining [facility]’s FollowMyHealth portal, you will also be able to view your health information using other applications (apps) compatible with our system.

## 2021-10-15 NOTE — ED PROVIDER NOTE - NSFOLLOWUPINSTRUCTIONS_ED_ALL_ED_FT
Back Pain    Back pain is very common in adults. The cause of back pain is rarely dangerous and the pain often gets better over time. The cause of your back pain may not be known and may include strain of muscles or ligaments, degeneration of the spinal disks, or arthritis. Occasionally the pain may radiate down your leg(s). Over-the-counter medicines to reduce pain and inflammation are often the most helpful. Stretching and remaining active frequently helps the healing process.     SEEK IMMEDIATE MEDICAL CARE IF YOU HAVE ANY OF THE FOLLOWING SYMPTOMS: bowel or bladder control problems, unusual weakness or numbness in your arms or legs, nausea or vomiting, abdominal pain, fever, dizziness/lightheadedness.         Please follow-up with your doctor(s) within the next 3 days, but see medical sooner if your symptoms persist or worsen.  Please call tomorrow for an appointment.    You were given a copy of your labs and/or imaging.  Please go-over these with your doctor(s).     If you have any worsening of symptoms or any other concerns please see your doctor or return to the ED immediately.    Please continue taking your home medications as directed.  Do not use alcohol when taking any medication (especially antibiotics, tylenol or other pain medication) unless you check with the doctor or pharmacist.     Fill the prescription for percocet, 1 pill every 6 hours for severe pain.  No not mix this medication with Tylenol as this medication already contains Tylenol.  Make sure to stay hydrated when taking this medication.  Please also use the stool softener Colace (also called docusate sodium) 100mg 3 times a day to avoid constipation which is common when taking percocet.  This can be purchased over the counter.  Watch for drowsiness while taking this medication. No driving or operating heavy machinery.     You can use over the counter lidoderm patches as directed

## 2021-10-15 NOTE — ED PROVIDER NOTE - PHYSICAL EXAMINATION
General:     NAD. morbidly obese  Eyes: PERRL  Head:     NC/AT, EOMI, oral mucosa moist  Neck:     trachea midline  Lungs:     CTA b/l  CVS:     RRR  Abd:     +BS, s/nt/nd  Spine: no midline tenderness  Ext:   no deformities, FROM, strength 5/5, sensation intact, cap refill <3, 2+ radial pulse, skin warm and dry wo color changes  Chest wall: no crepitus, no tenderness  Back: tenderness left upper back  Neuro: AAOx3, no sensory/motor deficits

## 2021-10-15 NOTE — ED PROVIDER NOTE - WR ORDER DATE AND TIME 1
Addended by: LEANNA ORTEGA on: 3/17/2021 12:37 PM     Modules accepted: Orders     15-Oct-2021 15:06
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
## 2021-10-15 NOTE — ED ADULT NURSE NOTE - NSICDXPASTMEDICALHX_GEN_ALL_CORE_FT
PAST MEDICAL HISTORY:  Arthritis Left Knee    Gout     Hypertension     MRSA (methicillin resistant staph aureus) culture positive Nasal culutre from 9/30/14    Obesity     Osteoarthrosis, localized Localized Primary - Lower Leg

## 2021-10-15 NOTE — ED ADULT NURSE NOTE - NSICDXFAMILYHX_GEN_ALL_CORE_FT
FAMILY HISTORY:  Father  Still living? Unknown  No significant family history, Age at diagnosis: Age Unknown    Mother  Still living? Yes, Estimated age: 61-70  No significant family history, Age at diagnosis: Age Unknown

## 2021-10-15 NOTE — ED ADULT NURSE NOTE - OBJECTIVE STATEMENT
Pt sent here by PCP after c/o tingling/numbness left fingertips.  Pt got injection into neck for back pain.
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
## 2021-10-26 ENCOUNTER — RESULT REVIEW (OUTPATIENT)
Age: 51
End: 2021-10-26

## 2022-01-20 ENCOUNTER — APPOINTMENT (OUTPATIENT)
Dept: CARDIOLOGY | Facility: CLINIC | Age: 52
End: 2022-01-20

## 2022-10-11 ENCOUNTER — APPOINTMENT (OUTPATIENT)
Dept: ORTHOPEDIC SURGERY | Facility: CLINIC | Age: 52
End: 2022-10-11

## 2022-10-20 NOTE — HISTORY OF PRESENT ILLNESS
[Right] : right hand dominant [FreeTextEntry1] : Patient is a 52 year old male who presents with complaints of

## 2022-10-20 NOTE — PHYSICAL EXAM
[de-identified] : Patient is WDWN, alert, and in no acute distress. Breathing is unlabored. He is grossly oriented to person, place, and time.\par \par Right Hand:

## 2023-01-03 NOTE — ED ADULT TRIAGE NOTE - HEART RATE (BEATS/MIN)
January 3, 2023      [facility]  [address]  [facility] [address]  Phone: [phone]       Patient: Leandra Lopes   YOB: 1965  Date of Visit: 01/03/2023    To Whom It May Concern:    Charles Lopes  was at [facility] on 01/03/2023. The patient may return to work/school on 01/06/2023 as long as symptoms are improving and no fever the preceding 24 hours without fever reducing medications. If you have any questions or concerns, or if I can be of further assistance, please do not hesitate to contact me.    Sincerely,    Jennifer Mixon, NP      66

## 2023-01-24 ENCOUNTER — APPOINTMENT (OUTPATIENT)
Dept: CT IMAGING | Facility: HOSPITAL | Age: 53
End: 2023-01-24

## 2023-03-03 ENCOUNTER — NON-APPOINTMENT (OUTPATIENT)
Age: 53
End: 2023-03-03

## 2023-03-03 ENCOUNTER — APPOINTMENT (OUTPATIENT)
Dept: RHEUMATOLOGY | Facility: CLINIC | Age: 53
End: 2023-03-03
Payer: COMMERCIAL

## 2023-03-03 VITALS
WEIGHT: 315 LBS | BODY MASS INDEX: 42.66 KG/M2 | RESPIRATION RATE: 16 BRPM | HEART RATE: 68 BPM | OXYGEN SATURATION: 98 % | TEMPERATURE: 98.2 F | HEIGHT: 72 IN

## 2023-03-03 PROCEDURE — 99204 OFFICE O/P NEW MOD 45 MIN: CPT

## 2023-03-03 RX ORDER — TADALAFIL 20 MG/1
20 TABLET ORAL
Qty: 6 | Refills: 0 | Status: DISCONTINUED | COMMUNITY
Start: 2019-07-02 | End: 2023-03-03

## 2023-03-03 RX ORDER — MULTIVIT-MIN/FOLIC/VIT K/LYCOP 400-300MCG
50 MCG TABLET ORAL
Qty: 100 | Refills: 2 | Status: DISCONTINUED | COMMUNITY
End: 2023-03-03

## 2023-03-03 RX ORDER — COLCHICINE 0.6 MG/1
0.6 CAPSULE ORAL
Refills: 0 | Status: ACTIVE | COMMUNITY

## 2023-03-03 RX ORDER — CLOPIDOGREL BISULFATE 75 MG/1
75 TABLET, FILM COATED ORAL
Refills: 0 | Status: ACTIVE | COMMUNITY

## 2023-03-03 RX ORDER — SILDENAFIL 100 MG/1
100 TABLET, FILM COATED ORAL
Qty: 3 | Refills: 5 | Status: DISCONTINUED | COMMUNITY
Start: 2017-03-08 | End: 2023-03-03

## 2023-03-03 RX ORDER — ASPIRIN 81 MG/1
81 TABLET, COATED ORAL
Refills: 0 | Status: ACTIVE | COMMUNITY

## 2023-03-03 NOTE — ASSESSMENT
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  Avoid higher doses of colchicine 2/2 concomitant statin use and renal issues, eventually would like to taper colchicine back to no more frequently than 3 times per week.\par - Prednisone taper provided to use in case of flare despite above medications.  Patient instructed on how to use. Risks and benefits of steroids were d/w patient including but not limited to weight gain, diabetes, HTN, avascular necrosis, osteoporosis, glaucoma, cataract, infections and immunosuppression.\par - Aware to avoid all NSAIDs including indomethacin\par -Complete renal work-up, optimize treatment plan to improve renal function if possible\par -Continue to avoid diet sodas, any other food triggers\par -Advised weight loss of 10 to 15% of current body weight as improving metabolic status may also decrease likelihood of gout flares\par - check serologies as below to rule out alternative etiologies and trend ESR\par - ESR may also be weight related or 2/2 flares\par - XRs to eval for erosive dz\par \par RTC 6 weeks

## 2023-03-03 NOTE — CONSULT LETTER
[Dear  ___] : Dear  [unfilled], [Consult Letter:] : I had the pleasure of evaluating your patient, [unfilled]. [Please see my note below.] : Please see my note below. [Consult Closing:] : Thank you very much for allowing me to participate in the care of this patient.  If you have any questions, please do not hesitate to contact me. [Sincerely,] : Sincerely, [FreeTextEntry3] : Julianne Rebolledo MD\par Rheumatology\par [facility] \par \par [address] [place] [address]\par P: [phone]\par F: [phone]\par

## 2023-03-03 NOTE — HISTORY OF PRESENT ILLNESS
[FreeTextEntry1] : MADELYN BALDWIN is a 52 year old man who presents with recurrent gout flares x 7-8 years, becoming more active in recent years, at present having 1 flare a month, in last few months now having polyarticular flares. Has had prior steroid injections with podiatry with good results, had previously used indomethacin, never given PO steroids. Recently given colchicine BID which has also been effective. Previously on Allopurinol with prior PMD, tolerated well, ran out at some point, never resumed as was told not to take while flaring and never had a flare free period long enough to start. No FH of crystalline arthritis. Never tapped, no recent images. sUA has been consistently high, worsening renal fxn over last few years as well, now getting renal w/u. No current dietary issues - refrains from red meat, shellfish, ETOH. Does note less severe flares since cutting out diet soda.\par \par Inflammatory arthritis ROS negative for symmetrical peripheral joint synovitis, prolonged AM stiffness, enthesitis, dactylitis, psoriasis, eye inflammation, inflammatory low back pain, IBD.  \par \par ESR high \par

## 2023-03-03 NOTE — REVIEW OF SYSTEMS
[As Noted in HPI] : as noted in HPI [Arthralgias] : arthralgias [Joint Pain] : joint pain [Joint Swelling] : joint swelling [Negative] : Heme/Lymph [Joint Stiffness] : no joint stiffness

## 2023-03-29 ENCOUNTER — APPOINTMENT (OUTPATIENT)
Dept: NEUROLOGY | Facility: CLINIC | Age: 53
End: 2023-03-29

## 2023-03-29 VITALS
WEIGHT: 315 LBS | BODY MASS INDEX: 42.66 KG/M2 | SYSTOLIC BLOOD PRESSURE: 130 MMHG | RESPIRATION RATE: 20 BRPM | HEART RATE: 81 BPM | TEMPERATURE: 97.6 F | OXYGEN SATURATION: 98 % | HEIGHT: 72 IN | DIASTOLIC BLOOD PRESSURE: 75 MMHG

## 2023-04-18 ENCOUNTER — APPOINTMENT (OUTPATIENT)
Dept: RHEUMATOLOGY | Facility: CLINIC | Age: 53
End: 2023-04-18
Payer: COMMERCIAL

## 2023-04-18 VITALS
HEART RATE: 105 BPM | SYSTOLIC BLOOD PRESSURE: 135 MMHG | DIASTOLIC BLOOD PRESSURE: 80 MMHG | RESPIRATION RATE: 17 BRPM | HEIGHT: 72 IN | TEMPERATURE: 97 F | WEIGHT: 315 LBS | BODY MASS INDEX: 42.66 KG/M2 | OXYGEN SATURATION: 96 %

## 2023-04-18 PROCEDURE — 99213 OFFICE O/P EST LOW 20 MIN: CPT

## 2023-04-18 NOTE — HISTORY OF PRESENT ILLNESS
[FreeTextEntry1] : MADELYN BALDWIN is a 52 year old man who presents with recurrent gout flares x 7-8 years, becoming more active in recent years, at present having 1 flare a month, in last few months now having polyarticular flares. Has had prior steroid injections with podiatry with good results, had previously used indomethacin, never given PO steroids. Recently given colchicine BID which has also been effective. Previously on Allopurinol with prior PMD, tolerated well, ran out at some point, never resumed as was told not to take while flaring and never had a flare free period long enough to start. No FH of crystalline arthritis. Never tapped, no recent images. sUA has been consistently high, worsening renal fxn over last few years as well, now getting renal w/u. No current dietary issues - refrains from red meat, shellfish, ETOH. Does note less severe flares since cutting out diet soda.\par \par Inflammatory arthritis ROS negative for symmetrical peripheral joint synovitis, prolonged AM stiffness, enthesitis, dactylitis, psoriasis, eye inflammation, inflammatory low back pain, IBD.  \par \par ESR high \par \par --------\par 4/18/23 -- Taking meds, no SE, only 1 mild L knee flare tho ?if gout as is a TKR, responded to steroids. No current activity.

## 2023-04-18 NOTE — PHYSICAL EXAM
[General Appearance - Alert] : alert [General Appearance - In No Acute Distress] : in no acute distress [General Appearance - Well Nourished] : well nourished [Sclera] : the sclera and conjunctiva were normal [Outer Ear] : the ears and nose were normal in appearance [Oropharynx] : the oropharynx was normal [Neck Appearance] : the appearance of the neck was normal [] : no respiratory distress [Heart Rate And Rhythm] : heart rate was normal and rhythm regular [Heart Sounds] : normal S1 and S2 [Murmurs] : no murmurs [Edema] : there was no peripheral edema [No Spinal Tenderness] : no spinal tenderness [Abnormal Walk] : normal gait [Nail Clubbing] : no clubbing  or cyanosis of the fingernails [Musculoskeletal - Swelling] : no joint swelling seen [Motor Tone] : muscle strength and tone were normal [No Focal Deficits] : no focal deficits [Oriented To Time, Place, And Person] : oriented to person, place, and time [Impaired Insight] : insight and judgment were intact [Affect] : the affect was normal [FreeTextEntry1] : Nonspecific, scaly rash over B/L elbows

## 2023-04-18 NOTE — CONSULT LETTER
[Dear  ___] : Dear  [unfilled], [Consult Letter:] : I had the pleasure of evaluating your patient, [unfilled]. [Please see my note below.] : Please see my note below. [Consult Closing:] : Thank you very much for allowing me to participate in the care of this patient.  If you have any questions, please do not hesitate to contact me. [Sincerely,] : Sincerely, [FreeTextEntry3] : Julianne Rebolledo MD\par Rheumatology\par [facility] \par \par [address] [place] [address]\par P: [phone]\par F: [phone]\par

## 2023-04-18 NOTE — ASSESSMENT
[FreeTextEntry1] : MADELYN BALDWIN is a 52 year old man with below -- \par \par # recurrent mono-oligoarticular inflammatory joint flares qmonthly, high sUA, + metabolic syndrome and worsening renal function, improvement with decreasing diet soda intake --highest on DDx is recurrent gout, however in light of elevated ESR and rash, do need to consider inflammatory arthritic conditions.\par - c/w allopurinol 100 mg/day -- Goal sUA <6, will titrate ULT dose to target\par - c/w colchicine but 3 times per week given concomitant statin use \par - Prednisone taper prn flare \par - Aware to avoid all NSAIDs including indomethacin\par - Complete renal work-up, optimize treatment plan to improve renal function if possible\par - Continue to avoid diet sodas, any other food triggers\par - Losing weight 2/2 on GLP 1 agonist \par - check serologies and XRs to rule out alternative etiologies and trend ESR and uric acid - has appt scheduled \par \par RTC 3 months

## 2023-05-01 ENCOUNTER — APPOINTMENT (OUTPATIENT)
Dept: RHEUMATOLOGY | Facility: CLINIC | Age: 53
End: 2023-05-01

## 2023-07-18 ENCOUNTER — APPOINTMENT (OUTPATIENT)
Dept: RHEUMATOLOGY | Facility: CLINIC | Age: 53
End: 2023-07-18
Payer: COMMERCIAL

## 2023-07-18 VITALS
DIASTOLIC BLOOD PRESSURE: 78 MMHG | WEIGHT: 315 LBS | HEIGHT: 72 IN | HEART RATE: 91 BPM | RESPIRATION RATE: 18 BRPM | BODY MASS INDEX: 42.66 KG/M2 | OXYGEN SATURATION: 97 % | TEMPERATURE: 97.3 F | SYSTOLIC BLOOD PRESSURE: 140 MMHG

## 2023-07-18 DIAGNOSIS — R79.82 ELEVATED C-REACTIVE PROTEIN (CRP): ICD-10-CM

## 2023-07-18 DIAGNOSIS — M65.9 SYNOVITIS AND TENOSYNOVITIS, UNSPECIFIED: ICD-10-CM

## 2023-07-18 PROCEDURE — 99214 OFFICE O/P EST MOD 30 MIN: CPT

## 2023-07-19 PROBLEM — R79.82 CRP ELEVATED: Status: ACTIVE | Noted: 2023-07-19

## 2023-07-19 PROBLEM — M65.9 SYNOVITIS OF KNEE: Status: ACTIVE | Noted: 2023-07-19

## 2023-07-19 NOTE — HISTORY OF PRESENT ILLNESS
[FreeTextEntry1] : MADELYN BALDWIN is a 52 year old man who presents with recurrent gout flares x 7-8 years, becoming more active in recent years, at present having 1 flare a month, in last few months now having polyarticular flares. Has had prior steroid injections with podiatry with good results, had previously used indomethacin, never given PO steroids. Recently given colchicine BID which has also been effective. Previously on Allopurinol with prior PMD, tolerated well, ran out at some point, never resumed as was told not to take while flaring and never had a flare free period long enough to start. No FH of crystalline arthritis. Never tapped, no recent images. sUA has been consistently high, worsening renal fxn over last few years as well, now getting renal w/u. No current dietary issues - refrains from red meat, shellfish, ETOH. Does note less severe flares since cutting out diet soda.\par \par Inflammatory arthritis ROS negative for symmetrical peripheral joint synovitis, prolonged AM stiffness, enthesitis, dactylitis, psoriasis, eye inflammation, inflammatory low back pain, IBD.  \par \par ESR high \par \par --------\par 4/18/23 -- Taking meds, no SE, only 1 mild L knee flare tho ?if gout as is a TKR, responded to steroids. No current activity. \par \par 7/18/23 -- Taking ULT, reportedly taking colchicine daily instead of as prescribed, no flares until few days ago when R knee flared, started prednisone with 85% current improvement.

## 2023-07-19 NOTE — REVIEW OF SYSTEMS
[Negative] : Heme/Lymph [As Noted in HPI] : as noted in HPI [Joint Pain] : joint pain [Joint Swelling] : joint swelling [Joint Stiffness] : no joint stiffness

## 2023-07-19 NOTE — ASSESSMENT
[FreeTextEntry1] : MADLEYN BALDWIN is a 53 year old man with below -- \par \par # recurrent mono-oligoarticular inflammatory joint flares qmonthly, high sUA, + metabolic syndrome and worsening renal function, improvement with decreasing diet soda intake --highest on DDx is recurrent gout, however in light of elevated ESR and rash, do need to consider inflammatory arthritic conditions.\par - c/w allopurinol 100 mg/day -- Goal sUA <6, will titrate ULT dose to target\par - c/w colchicine but 3 times per week given concomitant statin use -- advised on risks and importance of using as prescribed, pt will resume prescribed dose \par - complete Prednisone taper for this flare - written instructions again provided \par - Aware to avoid all NSAIDs including indomethacin\par - Complete renal work-up, optimize treatment plan to improve renal function if possible\par - Continue to avoid diet sodas, any other food triggers\par \par # persistent ESR/CRP, rash \par - check serologies and XRs to rule out alternative etiologies and trend ESR and uric acid - provided copies of prior orders, importance of obtaining this ASAP also explained to patient \par - ESR is nonspecific and may be multifactorial as elevated BMI can also elevate it \par \par RTC 3 months

## 2023-07-19 NOTE — PHYSICAL EXAM
[General Appearance - Alert] : alert [General Appearance - In No Acute Distress] : in no acute distress [General Appearance - Well Nourished] : well nourished [Sclera] : the sclera and conjunctiva were normal [Neck Appearance] : the appearance of the neck was normal [] : no respiratory distress [Edema] : there was no peripheral edema [No Spinal Tenderness] : no spinal tenderness [Abnormal Walk] : normal gait [Nail Clubbing] : no clubbing  or cyanosis of the fingernails [Musculoskeletal - Swelling] : no joint swelling seen [Motor Tone] : muscle strength and tone were normal [No Focal Deficits] : no focal deficits [Oriented To Time, Place, And Person] : oriented to person, place, and time [FreeTextEntry1] : Nonspecific, scaly rash over B/L elbows

## 2023-07-19 NOTE — CONSULT LETTER
[Dear  ___] : Dear  [unfilled], [Consult Letter:] : I had the pleasure of evaluating your patient, [unfilled]. [Please see my note below.] : Please see my note below. [Consult Closing:] : Thank you very much for allowing me to participate in the care of this patient.  If you have any questions, please do not hesitate to contact me. [Sincerely,] : Sincerely, [FreeTextEntry3] : Julianne Rebolledo MD\par Rheumatology\par [facility] \par \par [address] [place] [address]\par P: [phone]\par F: [phone]\par

## 2023-07-25 ENCOUNTER — RX RENEWAL (OUTPATIENT)
Age: 53
End: 2023-07-25

## 2023-07-26 ENCOUNTER — NON-APPOINTMENT (OUTPATIENT)
Age: 53
End: 2023-07-26

## 2023-09-28 ENCOUNTER — RX RENEWAL (OUTPATIENT)
Age: 53
End: 2023-09-28

## 2023-10-18 ENCOUNTER — APPOINTMENT (OUTPATIENT)
Dept: RHEUMATOLOGY | Facility: CLINIC | Age: 53
End: 2023-10-18

## 2023-10-30 ENCOUNTER — RX RENEWAL (OUTPATIENT)
Age: 53
End: 2023-10-30

## 2023-11-30 ENCOUNTER — APPOINTMENT (OUTPATIENT)
Dept: RHEUMATOLOGY | Facility: CLINIC | Age: 53
End: 2023-11-30
Payer: COMMERCIAL

## 2023-11-30 VITALS
RESPIRATION RATE: 18 BRPM | OXYGEN SATURATION: 96 % | HEIGHT: 72 IN | BODY MASS INDEX: 42.66 KG/M2 | DIASTOLIC BLOOD PRESSURE: 82 MMHG | HEART RATE: 88 BPM | SYSTOLIC BLOOD PRESSURE: 130 MMHG | TEMPERATURE: 97.7 F | WEIGHT: 315 LBS

## 2023-11-30 DIAGNOSIS — M65.9 SYNOVITIS AND TENOSYNOVITIS, UNSPECIFIED: ICD-10-CM

## 2023-11-30 PROCEDURE — 99214 OFFICE O/P EST MOD 30 MIN: CPT

## 2023-11-30 RX ORDER — ALLOPURINOL 100 MG/1
100 TABLET ORAL
Qty: 270 | Refills: 1 | Status: ACTIVE | COMMUNITY
Start: 2023-03-03 | End: 1900-01-01

## 2024-01-20 ENCOUNTER — INPATIENT (INPATIENT)
Facility: HOSPITAL | Age: 54
LOS: 1 days | Discharge: HOME | DRG: 698 | End: 2024-01-22
Attending: STUDENT IN AN ORGANIZED HEALTH CARE EDUCATION/TRAINING PROGRAM | Admitting: INTERNAL MEDICINE
Payer: COMMERCIAL

## 2024-01-20 VITALS
OXYGEN SATURATION: 96 % | HEIGHT: 72 IN | TEMPERATURE: 98 F | RESPIRATION RATE: 19 BRPM | SYSTOLIC BLOOD PRESSURE: 157 MMHG | HEART RATE: 70 BPM | WEIGHT: 315 LBS | DIASTOLIC BLOOD PRESSURE: 77 MMHG

## 2024-01-20 DIAGNOSIS — Z98.89 OTHER SPECIFIED POSTPROCEDURAL STATES: Chronic | ICD-10-CM

## 2024-01-20 DIAGNOSIS — N28.0 ISCHEMIA AND INFARCTION OF KIDNEY: ICD-10-CM

## 2024-01-20 LAB
ALBUMIN SERPL ELPH-MCNC: 3.1 G/DL — LOW (ref 3.3–5)
ALP SERPL-CCNC: 85 U/L — SIGNIFICANT CHANGE UP (ref 40–120)
ALT FLD-CCNC: 20 U/L — SIGNIFICANT CHANGE UP (ref 10–45)
AMPHET UR-MCNC: NEGATIVE — SIGNIFICANT CHANGE UP
ANION GAP SERPL CALC-SCNC: 10 MMOL/L — SIGNIFICANT CHANGE UP (ref 5–17)
APPEARANCE UR: CLEAR — SIGNIFICANT CHANGE UP
APTT BLD: 34.5 SEC — SIGNIFICANT CHANGE UP (ref 24.5–35.6)
AST SERPL-CCNC: 17 U/L — SIGNIFICANT CHANGE UP (ref 10–40)
BACTERIA # UR AUTO: NEGATIVE /HPF — SIGNIFICANT CHANGE UP
BARBITURATES UR SCN-MCNC: NEGATIVE — SIGNIFICANT CHANGE UP
BASOPHILS # BLD AUTO: 0.07 K/UL — SIGNIFICANT CHANGE UP (ref 0–0.2)
BASOPHILS NFR BLD AUTO: 0.8 % — SIGNIFICANT CHANGE UP (ref 0–2)
BENZODIAZ UR-MCNC: NEGATIVE — SIGNIFICANT CHANGE UP
BILIRUB SERPL-MCNC: 0.6 MG/DL — SIGNIFICANT CHANGE UP (ref 0.2–1.2)
BILIRUB UR-MCNC: NEGATIVE — SIGNIFICANT CHANGE UP
BUN SERPL-MCNC: 34 MG/DL — HIGH (ref 7–23)
CALCIUM SERPL-MCNC: 8.9 MG/DL — SIGNIFICANT CHANGE UP (ref 8.4–10.5)
CHLORIDE SERPL-SCNC: 105 MMOL/L — SIGNIFICANT CHANGE UP (ref 96–108)
CO2 SERPL-SCNC: 29 MMOL/L — SIGNIFICANT CHANGE UP (ref 22–31)
COCAINE METAB.OTHER UR-MCNC: POSITIVE
COLOR SPEC: YELLOW — SIGNIFICANT CHANGE UP
CREAT SERPL-MCNC: 1.94 MG/DL — HIGH (ref 0.5–1.3)
D DIMER BLD IA.RAPID-MCNC: 399 NG/ML DDU — HIGH
DIFF PNL FLD: ABNORMAL
EGFR: 41 ML/MIN/1.73M2 — LOW
EOSINOPHIL # BLD AUTO: 0.51 K/UL — HIGH (ref 0–0.5)
EOSINOPHIL NFR BLD AUTO: 5.7 % — SIGNIFICANT CHANGE UP (ref 0–6)
EPI CELLS # UR: 7 — SIGNIFICANT CHANGE UP
ETHANOL SERPL-MCNC: <3 MG/DL — SIGNIFICANT CHANGE UP (ref 0–3)
GLUCOSE SERPL-MCNC: 130 MG/DL — HIGH (ref 70–99)
GLUCOSE UR QL: NEGATIVE MG/DL — SIGNIFICANT CHANGE UP
HCT VFR BLD CALC: 40.1 % — SIGNIFICANT CHANGE UP (ref 39–50)
HGB BLD-MCNC: 13.5 G/DL — SIGNIFICANT CHANGE UP (ref 13–17)
HYALINE CASTS # UR AUTO: PRESENT
IMM GRANULOCYTES NFR BLD AUTO: 0.2 % — SIGNIFICANT CHANGE UP (ref 0–0.9)
INR BLD: 0.97 RATIO — SIGNIFICANT CHANGE UP (ref 0.85–1.18)
KETONES UR-MCNC: NEGATIVE MG/DL — SIGNIFICANT CHANGE UP
LEUKOCYTE ESTERASE UR-ACNC: NEGATIVE — SIGNIFICANT CHANGE UP
LIDOCAIN IGE QN: 40 U/L — SIGNIFICANT CHANGE UP (ref 16–77)
LYMPHOCYTES # BLD AUTO: 2.43 K/UL — SIGNIFICANT CHANGE UP (ref 1–3.3)
LYMPHOCYTES # BLD AUTO: 27.1 % — SIGNIFICANT CHANGE UP (ref 13–44)
MCHC RBC-ENTMCNC: 29 PG — SIGNIFICANT CHANGE UP (ref 27–34)
MCHC RBC-ENTMCNC: 33.7 GM/DL — SIGNIFICANT CHANGE UP (ref 32–36)
MCV RBC AUTO: 86.2 FL — SIGNIFICANT CHANGE UP (ref 80–100)
METHADONE UR-MCNC: NEGATIVE — SIGNIFICANT CHANGE UP
MONOCYTES # BLD AUTO: 0.86 K/UL — SIGNIFICANT CHANGE UP (ref 0–0.9)
MONOCYTES NFR BLD AUTO: 9.6 % — SIGNIFICANT CHANGE UP (ref 2–14)
NEUTROPHILS # BLD AUTO: 5.09 K/UL — SIGNIFICANT CHANGE UP (ref 1.8–7.4)
NEUTROPHILS NFR BLD AUTO: 56.6 % — SIGNIFICANT CHANGE UP (ref 43–77)
NITRITE UR-MCNC: NEGATIVE — SIGNIFICANT CHANGE UP
NRBC # BLD: 0 /100 WBCS — SIGNIFICANT CHANGE UP (ref 0–0)
OPIATES UR-MCNC: POSITIVE
PCP SPEC-MCNC: SIGNIFICANT CHANGE UP
PCP UR-MCNC: NEGATIVE — SIGNIFICANT CHANGE UP
PH UR: 5 — SIGNIFICANT CHANGE UP (ref 5–8)
PLATELET # BLD AUTO: 309 K/UL — SIGNIFICANT CHANGE UP (ref 150–400)
POTASSIUM SERPL-MCNC: 3.4 MMOL/L — LOW (ref 3.5–5.3)
POTASSIUM SERPL-SCNC: 3.4 MMOL/L — LOW (ref 3.5–5.3)
PROT SERPL-MCNC: 7.8 G/DL — SIGNIFICANT CHANGE UP (ref 6–8.3)
PROT UR-MCNC: 300 MG/DL
PROTHROM AB SERPL-ACNC: 11.1 SEC — SIGNIFICANT CHANGE UP (ref 9.5–13)
RBC # BLD: 4.65 M/UL — SIGNIFICANT CHANGE UP (ref 4.2–5.8)
RBC # FLD: 14.5 % — SIGNIFICANT CHANGE UP (ref 10.3–14.5)
RBC CASTS # UR COMP ASSIST: 10 /HPF — HIGH (ref 0–4)
SODIUM SERPL-SCNC: 144 MMOL/L — SIGNIFICANT CHANGE UP (ref 135–145)
SP GR SPEC: 1.02 — SIGNIFICANT CHANGE UP (ref 1–1.03)
THC UR QL: NEGATIVE — SIGNIFICANT CHANGE UP
TROPONIN I, HIGH SENSITIVITY RESULT: 14.5 NG/L — SIGNIFICANT CHANGE UP
TROPONIN I, HIGH SENSITIVITY RESULT: 18 NG/L — SIGNIFICANT CHANGE UP
UROBILINOGEN FLD QL: 0.2 MG/DL — SIGNIFICANT CHANGE UP (ref 0.2–1)
WBC # BLD: 8.98 K/UL — SIGNIFICANT CHANGE UP (ref 3.8–10.5)
WBC # FLD AUTO: 8.98 K/UL — SIGNIFICANT CHANGE UP (ref 3.8–10.5)
WBC UR QL: 0 /HPF — SIGNIFICANT CHANGE UP (ref 0–5)

## 2024-01-20 PROCEDURE — 71045 X-RAY EXAM CHEST 1 VIEW: CPT | Mod: 26

## 2024-01-20 PROCEDURE — 74177 CT ABD & PELVIS W/CONTRAST: CPT | Mod: 26,MA

## 2024-01-20 PROCEDURE — 99223 1ST HOSP IP/OBS HIGH 75: CPT | Mod: GC

## 2024-01-20 PROCEDURE — 99285 EMERGENCY DEPT VISIT HI MDM: CPT

## 2024-01-20 PROCEDURE — 93010 ELECTROCARDIOGRAM REPORT: CPT

## 2024-01-20 PROCEDURE — 70450 CT HEAD/BRAIN W/O DYE: CPT | Mod: 26,MA

## 2024-01-20 RX ORDER — ONDANSETRON 8 MG/1
4 TABLET, FILM COATED ORAL ONCE
Refills: 0 | Status: COMPLETED | OUTPATIENT
Start: 2024-01-20 | End: 2024-01-20

## 2024-01-20 RX ORDER — SODIUM CHLORIDE 9 MG/ML
1000 INJECTION INTRAMUSCULAR; INTRAVENOUS; SUBCUTANEOUS ONCE
Refills: 0 | Status: COMPLETED | OUTPATIENT
Start: 2024-01-20 | End: 2024-01-20

## 2024-01-20 RX ORDER — FAMOTIDINE 10 MG/ML
20 INJECTION INTRAVENOUS ONCE
Refills: 0 | Status: COMPLETED | OUTPATIENT
Start: 2024-01-20 | End: 2024-01-20

## 2024-01-20 RX ORDER — ACETAMINOPHEN 500 MG
650 TABLET ORAL EVERY 6 HOURS
Refills: 0 | Status: DISCONTINUED | OUTPATIENT
Start: 2024-01-20 | End: 2024-01-22

## 2024-01-20 RX ORDER — INFLUENZA VIRUS VACCINE 15; 15; 15; 15 UG/.5ML; UG/.5ML; UG/.5ML; UG/.5ML
0.5 SUSPENSION INTRAMUSCULAR ONCE
Refills: 0 | Status: DISCONTINUED | OUTPATIENT
Start: 2024-01-20 | End: 2024-01-22

## 2024-01-20 RX ORDER — ALLOPURINOL 300 MG
200 TABLET ORAL DAILY
Refills: 0 | Status: DISCONTINUED | OUTPATIENT
Start: 2024-01-21 | End: 2024-01-22

## 2024-01-20 RX ORDER — LANOLIN ALCOHOL/MO/W.PET/CERES
3 CREAM (GRAM) TOPICAL AT BEDTIME
Refills: 0 | Status: DISCONTINUED | OUTPATIENT
Start: 2024-01-20 | End: 2024-01-22

## 2024-01-20 RX ORDER — CLOPIDOGREL BISULFATE 75 MG/1
75 TABLET, FILM COATED ORAL DAILY
Refills: 0 | Status: DISCONTINUED | OUTPATIENT
Start: 2024-01-21 | End: 2024-01-22

## 2024-01-20 RX ORDER — ATORVASTATIN CALCIUM 80 MG/1
10 TABLET, FILM COATED ORAL AT BEDTIME
Refills: 0 | Status: DISCONTINUED | OUTPATIENT
Start: 2024-01-20 | End: 2024-01-22

## 2024-01-20 RX ORDER — NEBIVOLOL HYDROCHLORIDE 5 MG/1
10 TABLET ORAL DAILY
Refills: 0 | Status: DISCONTINUED | OUTPATIENT
Start: 2024-01-21 | End: 2024-01-22

## 2024-01-20 RX ORDER — AMLODIPINE BESYLATE 2.5 MG/1
5 TABLET ORAL DAILY
Refills: 0 | Status: DISCONTINUED | OUTPATIENT
Start: 2024-01-21 | End: 2024-01-22

## 2024-01-20 RX ORDER — ONDANSETRON 8 MG/1
4 TABLET, FILM COATED ORAL EVERY 8 HOURS
Refills: 0 | Status: DISCONTINUED | OUTPATIENT
Start: 2024-01-20 | End: 2024-01-22

## 2024-01-20 RX ORDER — ASPIRIN/CALCIUM CARB/MAGNESIUM 324 MG
81 TABLET ORAL DAILY
Refills: 0 | Status: DISCONTINUED | OUTPATIENT
Start: 2024-01-21 | End: 2024-01-22

## 2024-01-20 RX ORDER — METOCLOPRAMIDE HCL 10 MG
10 TABLET ORAL ONCE
Refills: 0 | Status: COMPLETED | OUTPATIENT
Start: 2024-01-20 | End: 2024-01-20

## 2024-01-20 RX ORDER — ENOXAPARIN SODIUM 100 MG/ML
168 INJECTION SUBCUTANEOUS ONCE
Refills: 0 | Status: COMPLETED | OUTPATIENT
Start: 2024-01-20 | End: 2024-01-20

## 2024-01-20 RX ADMIN — FAMOTIDINE 100 MILLIGRAM(S): 10 INJECTION INTRAVENOUS at 15:19

## 2024-01-20 RX ADMIN — SODIUM CHLORIDE 1000 MILLILITER(S): 9 INJECTION INTRAMUSCULAR; INTRAVENOUS; SUBCUTANEOUS at 15:19

## 2024-01-20 RX ADMIN — Medication 10 MILLIGRAM(S): at 21:15

## 2024-01-20 RX ADMIN — SODIUM CHLORIDE 1000 MILLILITER(S): 9 INJECTION INTRAMUSCULAR; INTRAVENOUS; SUBCUTANEOUS at 20:00

## 2024-01-20 RX ADMIN — ONDANSETRON 4 MILLIGRAM(S): 8 TABLET, FILM COATED ORAL at 15:20

## 2024-01-20 RX ADMIN — FAMOTIDINE 20 MILLIGRAM(S): 10 INJECTION INTRAVENOUS at 15:49

## 2024-01-20 RX ADMIN — SODIUM CHLORIDE 1000 MILLILITER(S): 9 INJECTION INTRAMUSCULAR; INTRAVENOUS; SUBCUTANEOUS at 16:30

## 2024-01-20 NOTE — ED PROVIDER NOTE - CLINICAL SUMMARY MEDICAL DECISION MAKING FREE TEXT BOX
53-year-old male with reported illicit drug use per family, reported cocaine use, patient with multiple episodes of nonbloody nonbilious emesis reported complaints of dizziness as per triage note, patient is a poor historian and denies any associated dizziness.  Denies any reported chest pain or shortness of breath, patient denies any associated diarrhea.  Denies any abdominal pain.  No reported sick contacts.  Reported using cocaine last night.    Urine drug screen, patient with reported illicit drug use cocaine use, with repeated episodes of vomiting,  will obtain CT head to rule out ICH as possible etiology of vomiting, CT abdomen pelvis as patient is poor historian, although low suspicion abdomen soft nontender nondistended.  Screening labs, chest x-ray personally reviewed without noted infiltrates.  Pending CT imaging and reading

## 2024-01-20 NOTE — ED ADULT NURSE NOTE - OBJECTIVE STATEMENT
Pt presents to ED BIB EMS after wife called ambulance stating patient was "acting weird" and "saying weird things". As per patient's wife, patient habiltually does cocaine every night. Patient vomiting on arrival. Patient Pt presents to ED BIB EMS after wife called ambulance stating patient was "acting weird" and "saying weird things". As per patient's wife, patient habiltually does cocaine every night. Patient denies any complaints and does not understand why he is here. Patient unable to tell me why the ambulance brought him here. Patient drowsy but responsive to verbal stimuli, patient agitated, loud, and angry when asked about drug use. Patient accompanied by wife who states he needs help. Patient states he "ate something that he thought was donut glaze" from counter and feels nauseas. Pt presents to ED BIB EMS after wife called ambulance stating patient was "acting weird" and "saying weird things". As per patient's wife, patient habiltually does cocaine every night. Patient denies any complaints and does not understand why he is here. Patient unable to tell me why the ambulance brought him here. Patient drowsy but responsive to verbal stimuli, patient agitated, loud, and angry when asked about drug use. Patient accompanied by wife who states he needs help. Patient states he "ate something that he thought was donut glaze" from counter and feels nauseas. Patient is hard of hearing at baseline.

## 2024-01-20 NOTE — PATIENT PROFILE ADULT - FUNCTIONAL ASSESSMENT - BASIC MOBILITY 6.
3-calculated by average/Not able to assess (calculate score using [facility] averaging method)

## 2024-01-20 NOTE — ED ADULT NURSE REASSESSMENT NOTE - NS ED NURSE REASSESS COMMENT FT1
Patient became agitated when mother and sons came in to visit. Patient yelling and cursing at his mother. Patient redirected.

## 2024-01-20 NOTE — PATIENT PROFILE ADULT - FALL HARM RISK - HARM RISK INTERVENTIONS

## 2024-01-20 NOTE — ED ADULT NURSE NOTE - NSFALLRISKINTERV_ED_ALL_ED

## 2024-01-20 NOTE — ED ADULT NURSE REASSESSMENT NOTE - NS ED NURSE REASSESS COMMENT FT1
Patient undressed, belongings placed in belonging closet. Patient wanded by security and belongings checked and wanded by security due to known drug use.

## 2024-01-20 NOTE — ED PROVIDER NOTE - OBJECTIVE STATEMENT
53-year-old male with reported illicit drug use per family, reported cocaine use, patient with multiple episodes of nonbloody nonbilious emesis reported complaints of dizziness as per triage note, patient is a poor historian and denies any associated dizziness.  Denies any reported chest pain or shortness of breath, patient denies any associated diarrhea.  Denies any abdominal pain.  No reported sick contacts.  Reported using cocaine last night.

## 2024-01-20 NOTE — H&P ADULT - NSHPPHYSICALEXAM_GEN_ALL_CORE
Vital Signs Last 24 Hrs  T(C): 36.8 (20 Jan 2024 23:14), Max: 36.8 (20 Jan 2024 23:14)  T(F): 98.3 (20 Jan 2024 23:14), Max: 98.3 (20 Jan 2024 23:14)  HR: 68 (20 Jan 2024 23:43) (62 - 70)  BP: 154/89 (20 Jan 2024 23:14) (139/83 - 166/85)  BP(mean): 117 (20 Jan 2024 18:19) (109 - 117)  RR: 10 (20 Jan 2024 23:14) (10 - 20)  SpO2: 97% (20 Jan 2024 23:43) (88% - 99%)    O2 Parameters below as of 20 Jan 2024 21:10  Patient On (Oxygen Delivery Method): nasal cannula  O2 Flow (L/min): 3    *At time of exam, patient satting well on room air.     PHYSICAL EXAM:    GENERAL: NAD, lying in bed comfortably  HEAD:  Atraumatic, Normocephalic  EYES: EOMI, PERRLA, conjunctiva and sclera clear  CHEST/LUNG: Clear to auscultation bilaterally, good air entry bilaterally; No wheezing, rales, or rhonchi. Unlabored respirations  HEART: Regular rate and rhythm. S1 and S2. No murmurs, rubs, or gallops  ABDOMEN: Soft, Nontender, Nondistended. Obese. Bowel sounds present. Negative Butler sign  EXTREMITIES:  2+ Peripheral Pulses. Capillary refill <2 seconds. No clubbing, cyanosis,  No splinter hemorrhages. Trace b/l LE edema  NERVOUS SYSTEM:  Alert & Oriented X3, speech clear. No deficits   MSK: FROM all 4 extremities, full and equal strength  SKIN: Psoriasis rash under neck, under skin folds of breasts, small amount on arms and legs

## 2024-01-20 NOTE — ED ADULT NURSE NOTE - CHPI ED NUR CONTEXT EMESIS TIMES PER DAY
Type Of Destruction Used: Curettage Wound Care: Vaseline Hide Additional Size Dimension?: No consent was obtained and risks were reviewed including but not limited to scarring, infection, bleeding, scabbing, incomplete removal, nerve damage and allergy to anesthesia. Cryotherapy Text: The wound bed was treated with cryotherapy after the biopsy was performed. Information: Selecting Yes will display possible errors in your note based on the variables you have selected. This validation is only offered as a suggestion for you. PLEASE NOTE THAT THE VALIDATION TEXT WILL BE REMOVED WHEN YOU FINALIZE YOUR NOTE. IF YOU WANT TO FAX A PRELIMINARY NOTE YOU WILL NEED TO TOGGLE THIS TO 'NO' IF YOU DO NOT WANT IT IN YOUR FAXED NOTE. Post-Care Instructions: I reviewed with the patient in detail post-care instructions. Patient is to keep the biopsy site dry overnight, then wash with soap and water and apply ointment daily until healed. Anesthesia Volume In Cc: 0.2 Body Location Override (Optional - Billing Will Still Be Based On Selected Body Map Location If Applicable): right lateral distal lower leg Depth Of Biopsy: dermis Additional Anesthesia Volume In Cc (Will Not Render If 0): 0 Hemostasis: Aluminum Chloride Silver Nitrate Text: The wound bed was treated with silver nitrate after the biopsy was performed. Anesthesia Type: 0.5% lidocaine with 1:200,000 epinephrine and a 1:10 solution of 8.4% sodium bicarbonate Biopsy Method: double edge Personna blade Size Of Lesion In Cm: 1.2 Electrodesiccation Text: The wound bed was treated with electrodesiccation after the biopsy was performed. Notification Instructions: Patient will be notified of biopsy results. However, patient instructed to call the office if not contacted within 2 weeks. Curettage Text: The wound bed was treated with curettage after the biopsy was performed. Dressing: bandage 4 Detail Level: Detailed Biopsy Type: H and E Billing Type: Third-Party Bill Was A Bandage Applied: Yes Electrodesiccation And Curettage Text: The wound bed was treated with electrodesiccation and curettage after the biopsy was performed.

## 2024-01-20 NOTE — ED ADULT NURSE NOTE - CHIEF COMPLAINT QUOTE
Pt BIBA from home, c/o dizziness/lightheaded, pt stated symptoms started after he ate some white stuff off kitchen counter, h/o coccaine abuse, pt is [place]  dizziness

## 2024-01-20 NOTE — PATIENT PROFILE ADULT - FUNCTIONAL ASSESSMENT - BASIC MOBILITY SCORE.
[facility] Call Center    Phone Message    May a detailed message be left on voicemail: yes     Reason for Call: Symptoms or Concerns     If patient has red-flag symptoms, warm transfer to triage line    Current symptom or concern: Severe cough and loss of taste and smell. Is nervous about the implications for his liver and would like a call back to discuss.    He is asking for a call back ASAP    Symptoms have been present for:  3 day(s)    Has patient previously been seen for this? No      Are there any new or worsening symptoms? Yes      Action Taken: Message routed to:  [facility] Hepatology    Travel Screening: Not Applicable                                                                       18

## 2024-01-20 NOTE — ED PROVIDER NOTE - PROGRESS NOTE DETAILS
I Dr. Jackson Sales discussed with Dr. Whiting nephrology on call, case discussed, recommend LDH level; if elevated he recommends admit for observation. If normal, may follow up with nephrology. I Dr. Jackson Sales discussed with Dr Olivo hospitalist about the case, recommends admission. Jackson Sales MD, EM/Toxicology Attending: D/w Dr. Marr Vascular surgery about case, recommend to rule out secondary causes of renal infarcts hypercoagulable workup, echo, renal duplex, and observation. Also says it possible that cocaine could be etiology after ruling out hypercoagulable workup.     discussed with family and the patient at bedside, ok for admission and workup. I Dr. Jackson Sales discussed with Dr. Whiting nephrology on call, case discussed, recommend LDH level; if elevated he recommends admit for observation. If normal, may follow up with nephrology. He also recommends vascular surgery consult for further instructions.

## 2024-01-20 NOTE — H&P ADULT - NSHPREVIEWOFSYSTEMS_GEN_ALL_CORE
Gen: No fever, normal appetite +weakness, diaphoresis   Eyes: No eye irritation or discharge  ENT: No ear pain, congestion, sore throat  Resp: No cough or trouble breathing  Cardiovascular: No chest pain or palpitation  Gastroenteric: + nausea/vomiting, NO diarrhea, constipation  :  No change in urine output; no dysuria  MS: No joint or muscle pain  Skin: No rashes  Neuro: No headache; no abnormal movements  Remainder negative, except as per the HPI

## 2024-01-20 NOTE — H&P ADULT - ATTENDING COMMENTS
I have personally seen and examined patient on the above date.  I discussed the case with Dr Shaw and I agree with findings and plan as detailed per note above, which I have amended where appropriate.    Superseding the above.  53M hx of HTN, CAD/stent x1, gout, morbid obesity, TERENCE on CPAP, habitual cocaine user, pw episode of lightedheadness, dizziness, associated with generalized weakness and generalized paresthesias throughout associated with sweats and nausea and as per wife appeared glazed/confused and called 911. Pt attributed this to 2 cookies he had eaten earlier today that inadvertently had some ?dried/glue over it. (wife reported an undercabinet light had fallen off with some residual glue on countertop). Denied any CP, SOB, palps, cough, diarrhea, dysuria. +vomiting at least 3 times in ED.   No personal hx of DVTs/PE, MI, CVA, or cancers  Denied family hx of DVTs/PEs  +FMhx of MI, CVA  Denied tob quit in 2008, no ETOH, +illicit cocaine use. not forthcoming on last dose use but per family pt uses every night. no hx of IVDU.  In ED afebrile P: 70 BP: 157/77 sat 96% on RA.   PE:  NC/AT  CV:S1S2, RRR, no mgr  CL:C TA bl   Abd: obese, soft, NT, ND, no RUQ tenderness  Ext: neg CCE.no splinter hemorrhages.  Skin: + psoriasis on neck, under breasts, elbows. scattered on abd and very mild in LE.  WBC: 9.98  hgb: 13.5 plt; 309 56%N K:3.4 BUN/cr: 34/1.94 trop: 18 lip: 41   utox: +opiate and +cocaine  UA mod blood    CTAP:   IMPRESSION:  Indeterminate bilateral renal lesions . Follow-up ultrasound versus CT or   MRI with and without contrast is recommended.  Query linear areas of decreased enhancement in both kidneys. Correlate   with urinalysis and clinical to exclude mild pyelonephritis versus renal   infarcts. Diagnosis includes  Asymmetric urinary bladder wall thickening. Correlation with urinalysis   and clinical findings is recommended.  Mild prostatomegaly.  Additional findings noted above.    CT head:   IMPRESSION:  Normal exam.    AP: 53M hx of HTN, CAD/stent x1, gout, morbid obesity, TERENCE on CPAP, habitual cocaine user, pw episode of lightedheadness, dizziness, associated with generalized weakness and generalized paresthesias throughout associated with sweats and nausea/vomiting and confusion.    #Transient episode of dizziness/weakness/diaphoresis/NV. appears resolved.  CT head neg for any acute changes. CTAP with distended GB and noted renal infarcts vs pyelo. UA negative other than hematuria.  trend trops, serial EKGs, ECHO, cardiology consult.   check RVP exclude occult infection.  check d-dimer --- addendum: 399. Hold off CT angio for now given renal insufficiency, check dopplers of bl LE. lovenox A/C x 1 dose, VQ scan.  trend LFTs - abd exam benign  #Incidental findings of ?renal infarcts   ?related to cocaine use  D/W Dr Sales ED - vascular and nephrology already consulted. Advised hypercoagulable workup, renal doppler, ECHO, trend LDH level.   heme consult/renal consult.   check blood cxs to be complete.   +hematuria/asymmetric urinary bladder wall thickening on CTAP - urology consult to be complete.  # Mild CHERYL on CKD (baseline cr 1.58-1.91)  avoid nephrotoxins.   check PVRs to be complete.   #CAD  cont asa/plavix/statin  #HTN  cont amlodipine, bystolic  #Gout  cont allopurinol.   #TERENCE  cont CPAP.     [facility] rev.  D/W Dr Sales ED.  Wife Sun at bedside updated [phone]

## 2024-01-20 NOTE — ED PROVIDER NOTE - PHYSICAL EXAMINATION
VITAL SIGNS: I have reviewed nursing notes and confirm.  CONSTITUTIONAL: well-appearing, non-toxic  SKIN: Warm dry, normal skin turgor  HEAD: NCAT  CARD: RRR, no murmurs, rubs or gallops  RESP: clear to ausculation b/l.  No rales, rhonchi, or wheezing.  ABD: soft, + BS, non-tender, non-distended, no rebound or guarding. No CVA tenderness  EXT: Full ROM, no bony tenderness, no pedal edema, no calf tenderness

## 2024-01-20 NOTE — ED ADULT TRIAGE NOTE - ACCOMPANIED BY
Anticipated Discharge Disposition: [facility]     Action: Patient discharging today to [facility] [place]. Covid test resulted and faxed to [phone]. Transportation set for 0900.     Barriers to Discharge: none at this time    Plan: Follow up as needed.     Addendum:  [facility] notified patient is discharging today.     1100  Informed by bedside RN TACHOT has not shown up yet. RN ZANA called gary and spoke with Chuy. Per Chuy he was not informed of any changes in time. Per [facility] website Chuy is showing [facility] is picking patient up between .     Group home updated. [facility] confirmed they received covid test results.    EMT/paramedic

## 2024-01-20 NOTE — H&P ADULT - ASSESSMENT
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  #elevated DDimer  -history of psoriasis  -CT with b/l renal infarcts  -Utox (+) opioids, cocaine  -UA with moderate blood, no signs of infection  -Unable to obtain CTA PE due to Kidney function  -consider AM v/q scan if  patient becomes SOB  -b/l Dopplers to r/o DVT  -f/u hypercoagulability workup  -f/u echo  -f/u cardiology, nephrology, hematology consults     #vomiting, dizziness  -No elevated WBC, afebrile  -CTH WNL  -UA with no signs of infection  -CT with gallbladder distension  -f/u blood culture, urine culture  -f/u RVP  -surgery consult    #likely mild CHERYL on CKD  -baseline Cr  -asymmetric bladder wall thickening, and moderate blood in urine  -avoid nephrotoxic agents  -bladder scan 8 hrs        #HTN  -c/w home Bystolic amlodipine    #TERENCE  -c/w CPAP at night     #CAD  -c/w home plavix, start aspirin #acute vomiting, dizziness, weakness diaphoresis, r/o infection  -No elevated WBC, afebrile  -CTH WNL  -UA with no signs of infection  -CT with gallbladder distension, benign abdominal exam  -Trop WNL x2  -EKG personally reviewed- sinus bradycardia  -f/u blood culture, urine culture  -f/u RVP  -trend LFTs, Serial abdominal exam  -cardiology consult    #ncidental finding of renal infarct on CT 2/2 cocaine use vs. thrombus  #elevated DDimer 399  -Vascular consulted in ED  -history of psoriasis  -Utox (+) opioids, cocaine  -UA with moderate blood, no signs of infection  -Sub Q lovenox once  -f/u b/l Dopplers to r/o DVT  -consider CTA chest/ VQ - patient with renal insufficiency  -f/u hypercoagulability workup  -f/u echo  -f/u cardiology, nephrology, hematology consults     #likely mild CHERYL on CKD  -baseline Cr 1.58-1.9  -asymmetric bladder wall thickening, and moderate blood in urine  -avoid nephrotoxic agents  -bladder scan 8 hrs  -urology consult    #HTN  -c/w home Bystolic amlodipine    #TERENCE  -c/w CPAP at night     #CAD  -c/w home plavix, start aspirin    #Gout  -c/w allopurinol      #DVT PPx  -full AC Lovenox dose    #FULL CODE    AM labs  Ambulate as tolerated  fall/aspiration risk    Wife Sun updated at bedside    d/w Dr. Olivo Patient is a 53M pmhx HTN, Obesity, TERENCE, Gout, psoriasis, CAD (s/p stent 2 years ago on Plavix) presenting with vomiting, weakness, diaphoresis, paresthesia (now resolved)admitted for hypercoagulability workup for incidental finding of renal infarcts.     #Acute vomiting, dizziness, weakness, diaphoresis r/o infection  #Acute metabolic toxic encephalopathy (now resolved)  -No elevated WBC, afebrile  -CTH WNL  -UA with no signs of infection  -CT with gallbladder distension, benign abdominal exam  -Trop WNL x2  -EKG personally reviewed- sinus bradycardia  -f/u blood culture, urine culture  -f/u RVP  -trend LFTs, Serial abdominal exam  -cardiology consult    #Incidental finding of renal infarct on CT 2/2 cocaine use vs. thrombus  #elevated DDimer 399  -Vascular consulted in ED, apprec recs  -history of psoriasis  -Utox (+) opioids, cocaine  -full AC Lovenox dose x 1,  re-eval in AM  -f/u b/l Dopplers to r/o DVT  -If dopplers positive, consider CTA chest/ VQ - patient with renal insufficiency  -f/u hypercoagulability workup  -f/u echo  -f/u cardiology, nephrology, hematology consults   -consider renal doppler    #likely mild CHERYL on CKD  -baseline Cr 1.58-1.9  -asymmetric bladder wall thickening, and moderate blood in urine  -avoid nephrotoxic agents  -bladder scan 8 hrs  -urology consult    #HTN  -c/w home Bystolic amlodipine    #TERENCE  -c/w CPAP at night     #CAD  -c/w home plavix, start aspirin    #Gout  -c/w allopurinol      #DVT PPx  -full AC Lovenox dose x 1,  re-eval in AM    #FULL CODE    AM labs  Ambulate as tolerated  fall/aspiration risk    Wife Sun updated at bedside    d/w Dr. Olivo Patient is a 53M pmhx HTN, Obesity, TERENCE, Gout, psoriasis, CAD (s/p stent 2 years ago on Plavix) presenting with vomiting, weakness, diaphoresis, paresthesia (now resolved)admitted for hypercoagulability workup for incidental finding of renal infarcts.     #Acute vomiting, dizziness, weakness, diaphoresis r/o infection  #Acute metabolic toxic encephalopathy (now resolved)  -No elevated WBC, afebrile  -CTH WNL  -UA with no signs of infection  -CXR with no focal consolidation  -CT with gallbladder distension, benign abdominal exam  -Trop WNL x2  -EKG personally reviewed- sinus bradycardia  -f/u blood culture, urine culture  -f/u RVP  -trend LFTs, Serial abdominal exam  -cardiology consult    #Incidental finding of renal infarct on CT 2/2 cocaine use vs. thrombus  #elevated DDimer 399  -Vascular consulted in ED, apprec recs  -history of psoriasis  -Utox (+) opioids, cocaine  -full AC Lovenox dose x 1,  re-eval in AM  -f/u b/l Dopplers to r/o DVT  -If dopplers positive, consider CTA chest/ VQ - patient with renal insufficiency  -f/u hypercoagulability workup  -f/u echo  -f/u cardiology, nephrology, hematology consults   -consider renal doppler    #likely mild CHERYL on CKD  -baseline Cr 1.58-1.9  -asymmetric bladder wall thickening, and moderate blood in urine  -avoid nephrotoxic agents  -bladder scan 8 hrs  -urology consult    #HTN  -c/w home Bystolic amlodipine    #TERENCE  -c/w CPAP at night     #CAD  -c/w home plavix, start aspirin    #Gout  -c/w allopurinol      #DVT PPx  -full AC Lovenox dose x 1,  re-eval in AM    #FULL CODE    AM labs  Ambulate as tolerated  fall/aspiration risk    Wife Sun updated at bedside    d/w Dr. Olivo

## 2024-01-20 NOTE — H&P ADULT - HISTORY OF PRESENT ILLNESS
Patient is a 53M pmhx HTN, Obesity, TERENCE, Gout, psoriasis, CAD (s/p stent 2 years ago on Plavix) presenting with vomiting, weakness. Per patient and wife, patient was in his usual state of health when he mistakenly ate glue and shortly after became nauseous diaphoretic and weak. Per wife, patient's eyes "did not look right" and he seemed to not be himself.   In the hospital patient had multiple bouts of vomiting.   Denies measured fever, abdominal pain, diarrhea, cough, SOB< chest pain, palpitations, burning with urination.   Patient had normal colonoscopy Denies personal history of strokes, clots, MI.   Father had a history of bloods clots, MI, stroke after COVID.   Takes all medications as prescribed. Patient was on mounjaro but it was dc due to kidney function. No Fhx cancer.   Denies alcohol use, quit smoking 2008, no IV drug use.      In ED afebrile P: 70 BP: 157/77 sat 96% on RA. Patient received 2L NS bolus, Zofran, Reglan.

## 2024-01-20 NOTE — ED ADULT TRIAGE NOTE - CHIEF COMPLAINT QUOTE
Pt BIBA from home, c/o dizziness/lightheaded, pt stated symptoms started after he ate some white stuff off kitchen counter, h/o coccaine abuse, pt is MERARY

## 2024-01-20 NOTE — ED ADULT NURSE REASSESSMENT NOTE - NS ED NURSE REASSESS COMMENT FT1
While patient was being hooked up to cardiac monitor patient began to vomit copious amounts of light brown liquid. Provider made aware

## 2024-01-21 ENCOUNTER — TRANSCRIPTION ENCOUNTER (OUTPATIENT)
Age: 54
End: 2024-01-21

## 2024-01-21 LAB
ALBUMIN SERPL ELPH-MCNC: 3 G/DL — LOW (ref 3.3–5)
ALP SERPL-CCNC: 81 U/L — SIGNIFICANT CHANGE UP (ref 40–120)
ALT FLD-CCNC: 16 U/L — SIGNIFICANT CHANGE UP (ref 10–45)
ANION GAP SERPL CALC-SCNC: 9 MMOL/L — SIGNIFICANT CHANGE UP (ref 5–17)
AST SERPL-CCNC: 19 U/L — SIGNIFICANT CHANGE UP (ref 10–40)
BILIRUB SERPL-MCNC: 0.6 MG/DL — SIGNIFICANT CHANGE UP (ref 0.2–1.2)
BUN SERPL-MCNC: 31 MG/DL — HIGH (ref 7–23)
CALCIUM SERPL-MCNC: 9 MG/DL — SIGNIFICANT CHANGE UP (ref 8.4–10.5)
CHLORIDE SERPL-SCNC: 106 MMOL/L — SIGNIFICANT CHANGE UP (ref 96–108)
CK SERPL-CCNC: 191 U/L — SIGNIFICANT CHANGE UP (ref 30–200)
CO2 SERPL-SCNC: 28 MMOL/L — SIGNIFICANT CHANGE UP (ref 22–31)
CREAT ?TM UR-MCNC: 162 MG/DL — SIGNIFICANT CHANGE UP
CREAT SERPL-MCNC: 1.84 MG/DL — HIGH (ref 0.5–1.3)
CULTURE RESULTS: SIGNIFICANT CHANGE UP
EGFR: 43 ML/MIN/1.73M2 — LOW
GLUCOSE SERPL-MCNC: 78 MG/DL — SIGNIFICANT CHANGE UP (ref 70–99)
HCT VFR BLD CALC: 40.9 % — SIGNIFICANT CHANGE UP (ref 39–50)
HCYS SERPL-MCNC: 21.9 UMOL/L — HIGH
HGB BLD-MCNC: 12.9 G/DL — LOW (ref 13–17)
LDH SERPL L TO P-CCNC: 268 U/L — HIGH (ref 50–242)
MCHC RBC-ENTMCNC: 28.2 PG — SIGNIFICANT CHANGE UP (ref 27–34)
MCHC RBC-ENTMCNC: 31.5 GM/DL — LOW (ref 32–36)
MCV RBC AUTO: 89.3 FL — SIGNIFICANT CHANGE UP (ref 80–100)
NRBC # BLD: 0 /100 WBCS — SIGNIFICANT CHANGE UP (ref 0–0)
PLATELET # BLD AUTO: 280 K/UL — SIGNIFICANT CHANGE UP (ref 150–400)
POTASSIUM SERPL-MCNC: 3.8 MMOL/L — SIGNIFICANT CHANGE UP (ref 3.5–5.3)
POTASSIUM SERPL-SCNC: 3.8 MMOL/L — SIGNIFICANT CHANGE UP (ref 3.5–5.3)
PROT ?TM UR-MCNC: 275 MG/DL — HIGH (ref 0–12)
PROT SERPL-MCNC: 7.2 G/DL — SIGNIFICANT CHANGE UP (ref 6–8.3)
PROT/CREAT UR-RTO: 1.7 RATIO — HIGH (ref 0–0.2)
RAPID RVP RESULT: SIGNIFICANT CHANGE UP
RBC # BLD: 4.58 M/UL — SIGNIFICANT CHANGE UP (ref 4.2–5.8)
RBC # FLD: 14.8 % — HIGH (ref 10.3–14.5)
SARS-COV-2 RNA SPEC QL NAA+PROBE: SIGNIFICANT CHANGE UP
SODIUM SERPL-SCNC: 143 MMOL/L — SIGNIFICANT CHANGE UP (ref 135–145)
SPECIMEN SOURCE: SIGNIFICANT CHANGE UP
WBC # BLD: 7.9 K/UL — SIGNIFICANT CHANGE UP (ref 3.8–10.5)
WBC # FLD AUTO: 7.9 K/UL — SIGNIFICANT CHANGE UP (ref 3.8–10.5)

## 2024-01-21 PROCEDURE — 93970 EXTREMITY STUDY: CPT | Mod: 26

## 2024-01-21 PROCEDURE — 99222 1ST HOSP IP/OBS MODERATE 55: CPT

## 2024-01-21 PROCEDURE — 93306 TTE W/DOPPLER COMPLETE: CPT | Mod: 26

## 2024-01-21 PROCEDURE — 99233 SBSQ HOSP IP/OBS HIGH 50: CPT | Mod: GC

## 2024-01-21 PROCEDURE — 76700 US EXAM ABDOM COMPLETE: CPT | Mod: 26

## 2024-01-21 PROCEDURE — G0452: CPT | Mod: 26

## 2024-01-21 RX ORDER — LOSARTAN POTASSIUM 100 MG/1
25 TABLET, FILM COATED ORAL DAILY
Refills: 0 | Status: DISCONTINUED | OUTPATIENT
Start: 2024-01-21 | End: 2024-01-22

## 2024-01-21 RX ADMIN — NEBIVOLOL HYDROCHLORIDE 10 MILLIGRAM(S): 5 TABLET ORAL at 06:04

## 2024-01-21 RX ADMIN — ATORVASTATIN CALCIUM 10 MILLIGRAM(S): 80 TABLET, FILM COATED ORAL at 21:41

## 2024-01-21 RX ADMIN — LOSARTAN POTASSIUM 25 MILLIGRAM(S): 100 TABLET, FILM COATED ORAL at 13:09

## 2024-01-21 RX ADMIN — AMLODIPINE BESYLATE 5 MILLIGRAM(S): 2.5 TABLET ORAL at 06:04

## 2024-01-21 RX ADMIN — Medication 200 MILLIGRAM(S): at 13:08

## 2024-01-21 RX ADMIN — Medication 81 MILLIGRAM(S): at 13:09

## 2024-01-21 RX ADMIN — ENOXAPARIN SODIUM 168 MILLIGRAM(S): 100 INJECTION SUBCUTANEOUS at 01:09

## 2024-01-21 RX ADMIN — CLOPIDOGREL BISULFATE 75 MILLIGRAM(S): 75 TABLET, FILM COATED ORAL at 13:09

## 2024-01-21 NOTE — DISCHARGE NOTE PROVIDER - HOSPITAL COURSE
3M pmhx HTN, CAD (s/p stent 2 years ago on Plavix), CKD stage 3, obesity, TERENCE, gout, psoriasis, and cocaine use presenting with non bloody/bilious vomiting, nausea and weakness after accidental ingestion of glue, after which wife noticed pt “not like himself”. In the ED patient had multiple bouts of vomiting. Vitals notable for /77, afebrile and normal HR. EKG sinus bradycardia. CXR unremarkable. CT A/P remarkable for renal infarcts vs pyelonephritis, and asymmetric bladder, and gallbladder distension. Labs remarkable for K+ 3.4, Cr 1.94, eGFR 41, UA blood and proteinuria, Ddimer 399, Utox positive for cocaine metabolite and opiate, homocysteine 21.9, . Patient received 2L NS bolus, Zofran, Reglan, full lovenox dose. Admitted for CHERYL on CKD, incidental finding of renal infarct for hypercoagulability work up. Patient with FHx of clots, MI and CVA. Vascular surgery, Cardiology, hematology, and nephrology consulted. Coagulopathy work up initiated. LE duplex revealed no DVT bilaterally. TTE with EF 60-65%, mild mitral valve regurgitation and mild thickening and calcification of anterior and posterior mitral valve leaflets.  Per nephrology, CT infarcts likely old and probably related to cocaine use. Review of outpatient records revealed CKD stage 3b, currently with proteinuria; pt started on Losartan. Cardiology evaluation recommended continuation of antiplatelet given history of cardiac stent one year ago, as discussed with his outpatient cardiologist Dr Frost.    Patient medically stabilized for discharge home. Must follow up with PCP, cardiologist, rheumatologist, and nephrologist after discharge.      3M pmhx HTN, CAD (s/p stent 2 years ago on Plavix), CKD stage 3, obesity, TERENCE, gout, psoriasis, and cocaine use presenting with non bloody/bilious vomiting, nausea and weakness after accidental ingestion of glue, after which wife noticed pt “not like himself”. In the ED patient had multiple bouts of vomiting. Vitals notable for /77, afebrile and normal HR. EKG sinus bradycardia. CXR unremarkable. CT A/P remarkable for renal infarcts vs pyelonephritis, and asymmetric bladder, and gallbladder distension. Labs remarkable for K+ 3.4, Cr 1.94, eGFR 41, UA blood and proteinuria, Ddimer 399, Utox positive for cocaine metabolite and opiate, homocysteine 21.9, . Patient received 2L NS bolus, Zofran, Reglan, full lovenox dose. Admitted for CHERYL on CKD, incidental finding of renal infarct for hypercoagulability work up. Patient with FHx of clots, MI and CVA. Cardiology, hematology, and nephrology consulted. Coagulopathy work up initiated. LE duplex revealed no DVT bilaterally. TTE with EF 60-65%, mild mitral valve regurgitation and mild thickening and calcification of anterior and posterior mitral valve leaflets.  Per nephrology, CT infarcts likely old and probably related to cocaine use. Review of outpatient records revealed CKD stage 3b, currently with proteinuria; pt started on Losartan. Cardiology evaluation recommended continuation of antiplatelet given history of cardiac stent one year ago, as discussed with his outpatient cardiologist Dr Frost. Patient medically stabilized for discharge home. Must follow up with PCP, cardiologist, rheumatologist, and nephrologist after discharge.     Vital Signs Last 24 Hrs  T(C): 37 (22 Jan 2024 05:26), Max: 37 (22 Jan 2024 05:26)  T(F): 98.6 (22 Jan 2024 05:26), Max: 98.6 (22 Jan 2024 05:26)  HR: 72 (22 Jan 2024 05:26) (64 - 81)  BP: 164/88 (22 Jan 2024 05:26) (150/79 - 165/102)  BP(mean): --  RR: 13 (22 Jan 2024 05:26) (13 - 15)  SpO2: 96% (22 Jan 2024 05:26) (94% - 97%)  Parameters below as of 22 Jan 2024 05:26  Patient On (Oxygen Delivery Method): room air    PHYSICAL EXAM  GENERAL: NAD, well-groomed, well-developed overweight  HEAD:  Atraumatic, Normocephalic  EYES: EOMI, conjunctiva and sclera clear  ENT: Moist mucous membranes,  NECK: Supple, No JVD, no bruits  CHEST/LUNG: Clear to percussion bilaterally; No rales, rhonchi, wheezing, or rubs  HEART: Regular rate and rhythm; No murmurs, rubs, or gallops PMI non displaced.  ABDOMEN: Soft, Nontender, Nondistended; Bowel sounds present  EXTREMITIES:  2+ Peripheral Pulses, No clubbing, cyanosis, or edema  SKIN: No rashes or lesions  NERVOUS SYSTEM:  Cranial Nerves II-XII intact      Lab:                    12.9   7.90  )-----------( 280      ( 21 Jan 2024 07:35 )             40.9     01-21  143  |  106  |  31<H>  ----------------------------<  78  3.8   |  28  |  1.84<H>  Ca    9.0      21 Jan 2024 07:35  TPro  7.2  /  Alb  3.0<L>  /  TBili  0.6  /  DBili  x   /  AST  19  /  ALT  16  /  AlkPhos  81  01-21          Urinalysis Basic - ( 21 Jan 2024 07:35 )  Color: x / Appearance: x / SG: x / pH: x  Gluc: 78 mg/dL / Ketone: x  / Bili: x / Urobili: x   Blood: x / Protein: x / Nitrite: x   Leuk Esterase: x / RBC: x / WBC x   Sq Epi: x / Non Sq Epi: x / Bacteria: x    PT/INR - ( 20 Jan 2024 21:50 )   PT: 11.1 sec;   INR: 0.97 ratio    PTT - ( 20 Jan 2024 21:50 )  PTT:34.5 sec  CARDIAC MARKERS ( 21 Jan 2024 07:35 )  x     / x     / 191 U/L / x     / x          CAPILLARY BLOOD GLUCOSE  POCT Blood Glucose.: 116 mg/dL (20 Jan 2024 14:19)      CT Abdomen and Pelvis w/ IV Cont (01.20.24 @ 19:05)    IMPRESSION:  Indeterminate bilateral renal lesions . Follow-up ultrasound versus CT or   MRI with and without contrast is recommended.  Query linear areas of decreased enhancement in both kidneys. Correlate   with urinalysis and clinical to exclude mild pyelonephritis versus renal   infarcts. Diagnosis includes  Asymmetric urinary bladder wall thickening. Correlation with urinalysis   and clinical findings is recommended.  Mild prostatomegaly.  Additional findings noted above.             3M pmhx HTN, CAD (s/p stent 2 years ago on Plavix), CKD stage 3, obesity, TERENCE, gout, psoriasis, and cocaine use presenting with non bloody/bilious vomiting, nausea and weakness after accidental ingestion of glue, after which wife noticed pt “not like himself”. In the ED patient had multiple bouts of vomiting. Vitals notable for /77, afebrile and normal HR. EKG sinus bradycardia. CXR unremarkable. CT A/P remarkable for renal infarcts vs pyelonephritis, and asymmetric bladder, and gallbladder distension. Labs remarkable for K+ 3.4, Cr 1.94, eGFR 41, UA blood and proteinuria, Ddimer 399, Utox positive for cocaine metabolite and opiate, homocysteine 21.9, . Patient received 2L NS bolus, Zofran, Reglan, full lovenox dose. Admitted for CHERYL on CKD, incidental finding of renal infarct for hypercoagulability work up. Patient with FHx of clots, MI and CVA. Cardiology, hematology, and nephrology consulted. Coagulopathy work up initiated. LE duplex revealed no DVT bilaterally. TTE with EF 60-65%, mild mitral valve regurgitation and mild thickening and calcification of anterior and posterior mitral valve leaflets.  Per nephrology, CT infarcts likely old and probably related to cocaine use. Review of outpatient records revealed CKD stage 3b, currently with proteinuria; pt started on Losartan. Cardiology evaluation recommended continuation of antiplatelet given history of cardiac stent one year ago, as discussed with his outpatient cardiologist Dr Frost. Patient adamant about going home and is medically stabilized for discharge home. Must follow up with PCP, cardiologist, rheumatologist, and nephrologist after discharge.     Vital Signs Last 24 Hrs  T(C): 37 (22 Jan 2024 05:26), Max: 37 (22 Jan 2024 05:26)  T(F): 98.6 (22 Jan 2024 05:26), Max: 98.6 (22 Jan 2024 05:26)  HR: 72 (22 Jan 2024 05:26) (64 - 81)  BP: 164/88 (22 Jan 2024 05:26) (150/79 - 165/102)  BP(mean): --  RR: 13 (22 Jan 2024 05:26) (13 - 15)  SpO2: 96% (22 Jan 2024 05:26) (94% - 97%)  Parameters below as of 22 Jan 2024 05:26  Patient On (Oxygen Delivery Method): room air    PHYSICAL EXAM  GENERAL: NAD, well-groomed, well-developed overweight  HEAD:  Atraumatic, Normocephalic  EYES: EOMI, conjunctiva and sclera clear  ENT: Moist mucous membranes,  NECK: Supple, No JVD, no bruits  CHEST/LUNG: Clear to percussion bilaterally; No rales, rhonchi, wheezing, or rubs  HEART: Regular rate and rhythm; No murmurs, rubs, or gallops PMI non displaced.  ABDOMEN: Soft, Nontender, Nondistended; Bowel sounds present  EXTREMITIES:  2+ Peripheral Pulses, No clubbing, cyanosis, or edema  SKIN: No rashes or lesions  NERVOUS SYSTEM:  Cranial Nerves II-XII intact      Lab:                    12.9   7.90  )-----------( 280      ( 21 Jan 2024 07:35 )             40.9     01-21  143  |  106  |  31<H>  ----------------------------<  78  3.8   |  28  |  1.84<H>  Ca    9.0      21 Jan 2024 07:35  TPro  7.2  /  Alb  3.0<L>  /  TBili  0.6  /  DBili  x   /  AST  19  /  ALT  16  /  AlkPhos  81  01-21          Urinalysis Basic - ( 21 Jan 2024 07:35 )  Color: x / Appearance: x / SG: x / pH: x  Gluc: 78 mg/dL / Ketone: x  / Bili: x / Urobili: x   Blood: x / Protein: x / Nitrite: x   Leuk Esterase: x / RBC: x / WBC x   Sq Epi: x / Non Sq Epi: x / Bacteria: x    PT/INR - ( 20 Jan 2024 21:50 )   PT: 11.1 sec;   INR: 0.97 ratio    PTT - ( 20 Jan 2024 21:50 )  PTT:34.5 sec  CARDIAC MARKERS ( 21 Jan 2024 07:35 )  x     / x     / 191 U/L / x     / x          CAPILLARY BLOOD GLUCOSE  POCT Blood Glucose.: 116 mg/dL (20 Jan 2024 14:19)      CT Abdomen and Pelvis w/ IV Cont (01.20.24 @ 19:05)    IMPRESSION:  Indeterminate bilateral renal lesions . Follow-up ultrasound versus CT or   MRI with and without contrast is recommended.  Query linear areas of decreased enhancement in both kidneys. Correlate   with urinalysis and clinical to exclude mild pyelonephritis versus renal   infarcts. Diagnosis includes  Asymmetric urinary bladder wall thickening. Correlation with urinalysis   and clinical findings is recommended.  Mild prostatomegaly.  Additional findings noted above.

## 2024-01-21 NOTE — DISCHARGE NOTE PROVIDER - NSDCMRMEDTOKEN_GEN_ALL_CORE_FT
allopurinol 200 mg oral tablet: 1 tab(s) orally once a day  amLODIPine 5 mg oral tablet: 1 tab(s) orally once a day  atorvastatin:   Bystolic 10 mg oral tablet: 1 tab(s) orally once a day  Plavix 75 mg oral tablet: 1 tab(s) orally once a day   allopurinol 200 mg oral tablet: 1 tab(s) orally once a day  amLODIPine 5 mg oral tablet: 1 tab(s) orally once a day  aspirin 81 mg oral tablet, chewable: 1 tab(s) orally once a day  atorvastatin: 1 tab(s) orally once a day one tablet daily  Bystolic 10 mg oral tablet: 1 tab(s) orally once a day  losartan 25 mg oral tablet: 1 tab(s) orally once a day  Plavix 75 mg oral tablet: 1 tab(s) orally once a day

## 2024-01-21 NOTE — DISCHARGE NOTE PROVIDER - ATTENDING DISCHARGE PHYSICAL EXAMINATION:
A+Ox3 (person, place, time), no FND, lungs CTA b/l, no murmurs, abd NT/ND, no lower extremity swelling

## 2024-01-21 NOTE — DISCHARGE NOTE PROVIDER - NSDCCPCAREPLAN_GEN_ALL_CORE_FT
PRINCIPAL DISCHARGE DIAGNOSIS  Diagnosis: Renal infarct  Assessment and Plan of Treatment: During this admission, imaging of your abdomen incidentally found ischemic area on both your kidneys. This are called infarcts that seem to be old, likely secondary to cocaine use. Nonetheless you were evaluated for coagulopathy given your family history of clots. Work up revealed ***ADD**  You must follow up with your PCP and nephrologist after this admission      SECONDARY DISCHARGE DIAGNOSES  Diagnosis: Stage 3 chronic kidney disease  Assessment and Plan of Treatment: During this admission work up revealed you have Chronic kidney disease (CKD). This is when the kidneys have become damaged over time (for at least 3 months) and have a hard time doing all their important jobs. CKD also increases the risk of other health problems like heart disease and stroke. Developing CKD is usually a very slow process with very few symptoms at first. So, CKD is divided into 5 stages to help guide treatment decisions. You currently classify as stage 3. You were initiated on a medication called losartan to protect your kidneys and help control your high blood pressure. You must follow up with your PCP, cardiologist and nephrologist for follow up.    Diagnosis: CAD S/P percutaneous coronary angioplasty  Assessment and Plan of Treatment: During this admission, the cardiology team evaluated you for possible abnormalities of the heart that coudl increase your risk for clots. Echo of your heart did not show any signs of bacterial involvement. Care was coordinated with your outpatient cardiologist and in agreement it was suggested you keep taking your medications clopidogrel given the recent history of stent placement in your heart. You must follow up wiht your cardiologist for close follow up     PRINCIPAL DISCHARGE DIAGNOSIS  Diagnosis: Renal infarct  Assessment and Plan of Treatment: During this admission, imaging of your abdomen incidentally found ischemic area on both your kidneys. This are called infarcts that seem to be old, likely secondary to cocaine use. Nonetheless you were evaluated for coagulopathy given your family history of clots. You were evaluated by cardiology, heme/onc and nephrology who felt that you did not need to be placed on blood thinners for this and could follow up with further workup outpatient.   You must follow up with your PCP and nephrologist after this admission      SECONDARY DISCHARGE DIAGNOSES  Diagnosis: Stage 3 chronic kidney disease  Assessment and Plan of Treatment: During this admission work up revealed you have Chronic kidney disease (CKD). This is when the kidneys have become damaged over time (for at least 3 months) and have a hard time doing all their important jobs. CKD also increases the risk of other health problems like heart disease and stroke. Developing CKD is usually a very slow process with very few symptoms at first. So, CKD is divided into 5 stages to help guide treatment decisions. You currently classify as stage 3. You were initiated on a medication called losartan to protect your kidneys and help control your high blood pressure. You must follow up with your PCP, cardiologist and nephrologist for follow up.    Diagnosis: CAD S/P percutaneous coronary angioplasty  Assessment and Plan of Treatment: During this admission, the cardiology team evaluated you for possible abnormalities of the heart that coudl increase your risk for clots. Echo of your heart did not show any signs of bacterial involvement. Care was coordinated with your outpatient cardiologist and in agreement it was suggested you keep taking your medications clopidogrel given the recent history of stent placement in your heart. You must follow up wiht your cardiologist for close follow up

## 2024-01-21 NOTE — CHART NOTE - NSCHARTNOTEFT_GEN_A_CORE
Spoke to Urology Dr. Pineda. Says to follow up with cystoscopy outpatient. NO need for inpatient management of hematuria and bladder wall thickening unless patient's hemoglobin become unstable. WIll sign out to day team.

## 2024-01-21 NOTE — DISCHARGE NOTE PROVIDER - PROVIDER TOKENS
PROVIDER:[TOKEN:[[id number]]],PROVIDER:[TOKEN:[[id number]]],PROVIDER:[TOKEN:[[id number]]]

## 2024-01-21 NOTE — PROGRESS NOTE ADULT - ATTENDING COMMENTS
Agree with resident plan above, please see note for full details of the service      PE: A+Ox3, no FND, lungs CTA b/l, no murmurs, abd NT/ND, no CVA tenderness, no lower extremity swelling.      Assessment:   - Acute metabolic encephalopathy/transient episode of dizziness/weakness/diaphoresis (now resolved) possibly secondary to eating glue? Inhalational effect? Cocaine?  - Kidney lesions possibly cysts vs. renal infarcts (cocaine use?) -> less likely pyelonephritis   - Elevated d-dimer   - Hypokalemia  - Hyperglycemia  - Microscopic hematuria, proteinuria  - Incidental findings of hepatomegaly, splenomegaly, distended gallbladder, bladder wall thickening, distal esophageal thickening, mild prostatomegaly  - Utox positive for cocaine and opiates   - Hypoalbuminemia   - Hx of HTN, obesity, TERENCE on CAP, psoriasis, CAD s/p stent, CKD       Plan  - f/u lower extremity dopplers -> if positive will obtain V/Q scan  - Cr has been elevated previously suggestive of CKD rather than CHERYL; however, pt with significant proteinuria -> start ARB as recommended by nephrology but pt will need close f/u outpt   - f/u HbA1c, ECHO, BCx x 2   - Night resident spoke to urology -> will need outpt f/u with cystoscopy/workup for microscopic hematuria/prostatomegaly  - Ordered RUQ US and renal US  - s/p 1 dose of Lovenox 1 mg/kg -> nephrology recommending no anticoagulation at this time   - Spoke to Heme/Onc who also recommended no anticoagulation at this time and f/u with hypercoagulable workup outpt   - f/u with GI outpt for hepatosplenomegaly and distended gallbladder (may also need surgery evaluation)  - D/C planning: anticipate 24-48 hours Agree with resident plan above, please see note for full details of the service      PE: A+Ox3, no FND, lungs CTA b/l, no murmurs, abd NT/ND, no CVA tenderness, no lower extremity swelling.      Assessment:   - Acute metabolic encephalopathy/transient episode of dizziness/weakness/diaphoresis (now resolved) possibly secondary to eating glue? Inhalational effect? Cocaine?  - Kidney lesions possibly cysts vs. renal infarcts (cocaine use?) -> less likely pyelonephritis   - Elevated d-dimer   - Hypokalemia  - Hyperglycemia  - Microscopic hematuria, proteinuria  - Incidental findings of hepatomegaly, splenomegaly, distended gallbladder, bladder wall thickening, distal esophageal thickening, mild prostatomegaly  - Utox positive for cocaine and opiates   - Hypoalbuminemia   - Hx of HTN, obesity, TERENCE on CAP, psoriasis, CAD s/p stent, CKD       Plan  - f/u lower extremity dopplers -> if positive will obtain V/Q scan  - Cr has been elevated previously suggestive of CKD rather than CHERYL; however, pt with significant proteinuria -> start ARB as recommended by nephrology but pt will need close f/u outpt   - f/u HbA1c, ECHO, BCx x 2   - Night resident spoke to urology -> will need outpt f/u with cystoscopy/workup for microscopic hematuria/prostatomegaly  - Ordered RUQ US and renal US  - s/p 1 dose of Lovenox 1 mg/kg -> nephrology recommending no anticoagulation at this time   - Spoke to Heme/Onc who also recommended no anticoagulation at this time and f/u with hypercoagulable workup outpt   - f/u with GI outpt for hepatosplenomegaly and distended gallbladder (may also need surgery evaluation)  - D/C planning: anticipate 24-48 hours    I spent a total of 45 minutes on the date of this encounter coordinating the patient's care. This includes reviewing results/imaging and discussions with specialists, nursing, case management/social work. Further tests, medications, and procedures have been ordered as indicated. Results and the plan of care were communicated to the patient and/or their family member. Supporting documentation was completed and added to the patient's chart.

## 2024-01-21 NOTE — PROGRESS NOTE ADULT - SUBJECTIVE AND OBJECTIVE BOX
53M pmhx HTN, Obesity, TERENCE, Gout, psoriasis, CAD (s/p stent 2 years ago on Plavix) presenting with vomiting, weakness, diaphoresis, paresthesia (now resolved). Admitted for hypercoagulability workup for incidental finding of renal infarcts.       52yo M with history of HTN, Obesity, TERENCE, Gout, psoriasis, CAD (s/p stent 2 years ago on Plavix) presenting with vomiting, weakness, diaphoresis, paresthesia (now resolved). Admitted for hypercoagulability workup for incidental finding of renal infarcts.      Vital Signs Last 24 Hrs  T(C): 37 (21 Jan 2024 05:58), Max: 37 (21 Jan 2024 05:58)  T(F): 98.6 (21 Jan 2024 05:58), Max: 98.6 (21 Jan 2024 05:58)  HR: 84 (21 Jan 2024 05:58) (62 - 84)  BP: 152/98 (21 Jan 2024 05:58) (139/83 - 166/85)  BP(mean): 117 (20 Jan 2024 18:19) (109 - 117)  RR: 13 (21 Jan 2024 05:58) (10 - 20)  SpO2: 98% (21 Jan 2024 05:58) (88% - 99%)  Parameters below as of 20 Jan 2024 21:10  Patient On (Oxygen Delivery Method): room air      PHYSICAL EXAM:  GENERAL: NAD, lying in bed comfortably, obese  HEAD:  Atraumatic, Normocephalic  EYES: EOMI, conjunctiva and sclera clear  ENT: Moist mucous membranes  NECK: Supple, No JVD  RESP: Clear to auscultation bilaterally, good air entry bilaterally; No wheezing, rales, or rhonchi. Unlabored respirations  CARDIAC: Regular rate and rhythm. S1 and S2. No murmurs, rubs, or gallops  GI:  Soft, Nontender, Nondistended. Bowel sounds present x4 quadrants; No hepatomegaly. No splenomegaly.  EXTREMITIES:  2+ Peripheral Pulses. Capillary refill <2 seconds. No clubbing, cyanosis, or edema  NERVOUS SYSTEM:  Alert & Oriented X3, speech clear. No deficits   MSK: FROM all 4 extremities, full and equal strength  SKIN: psoariatic coin shaped lesions of different sizes throughout neck      Labs:                     12.9   7.90  )-----------( 280      ( 21 Jan 2024 07:35 )             40.9     01-21  143  |  106  |  31<H>  ----------------------------<  78  3.8   |  28  |  1.84<H>  Ca    9.0      21 Jan 2024 07:35  TPro  7.2  /  Alb  3.0<L>  /  TBili  0.6  /  DBili  x   /  AST  19  /  ALT  16  /  AlkPhos  81  01-21          Urinalysis Basic - ( 21 Jan 2024 07:35 )  Color: x / Appearance: x / SG: x / pH: x  Gluc: 78 mg/dL / Ketone: x  / Bili: x / Urobili: x   Blood: x / Protein: x / Nitrite: x   Leuk Esterase: x / RBC: x / WBC x   Sq Epi: x / Non Sq Epi: x / Bacteria: x      PT/INR - ( 20 Jan 2024 21:50 )   PT: 11.1 sec;   INR: 0.97 ratio    PTT - ( 20 Jan 2024 21:50 )  PTT:34.5 sec  CARDIAC MARKERS ( 21 Jan 2024 07:35 )  x     / x     / 191 U/L / x     / x        CAPILLARY BLOOD GLUCOSE  POCT Blood Glucose.: 116 mg/dL (20 Jan 2024 14:19)      Xray Chest 1 View AP/PA:   ACC: [id number] EXAM:  XR CHEST AP OR PA 1V   ORDERED BY: KYLE TERAN     IMPRESSION:  1. Cardiac silhouette may be magnified by technique.  2. Clear lungs with no infiltrates, pleural effusions or CHF.      CT Abdomen and Pelvis w/ IV Cont (01.20.24 @ 19:05)  FINDINGS:  LOWER CHEST: Trace aortic valve calcification. Clear lung bases.    LIVER: Hepatomegaly 23.3 cm cephalocaudal length.  BILE DUCTS: Normal caliber.  GALLBLADDER: Distended.  SPLEEN: Splenomegaly 14.6 cm cephalocaudal length  PANCREAS: Within normal limits.  ADRENALS: Within normal limits.  KIDNEYS/URETERS: There are multiple intermediate density lesions in both   kidneys largest 1.6 cm. Suggestion of of linear areas of decreased   attenuation in bilateral renal cortex. Dilated right extrarenal pelvis.   Focally dilated bilateral pelvic ureters.    BLADDER: Asymmetric urinary bladder wall thickening may represent   cystitis, urothelial neoplasm, or urinary bladder outlet obstruction.  REPRODUCTIVE ORGANS: Prostatomegaly.    BOWEL: No bowel obstruction. Appendix is normal. Bilateral rectosigmoid   colon fat stranding. Mild distal esophageal wall thickening. Moderate   amount of colonic stool may represent constipation.  PERITONEUM: No ascites.  VESSELS: Within normal limits.  RETROPERITONEUM/LYMPH NODES: Slightly prominent bilateral external iliac   lymph nodes.  ABDOMINAL WALL: Tiny fat-containing umbilical hernia.  BONES: Multilevel degenerative changes. Grade 1 retrolisthesis L5 over S1.    IMPRESSION:  Indeterminate bilateral renal lesions . Follow-up ultrasound versus CT or   MRI with and without contrast is recommended.    Query linear areas of decreased enhancement in both kidneys. Correlate   with urinalysis and clinical to exclude mild pyelonephritis versus renal   infarcts. Diagnosis includes    Asymmetric urinary bladder wall thickening. Correlation with urinalysis   and clinical findings is recommended.    Mild prostatomegaly.    Additional findings noted above.        GRANT LUGO MD; Attending Radiologist  This document has been electronically signed. Jan 20 2024  3:18PM (01-20-24 @ 15:13)

## 2024-01-21 NOTE — PROGRESS NOTE ADULT - ASSESSMENT
53M pmhx HTN, Obesity, TERENCE, Gout, psoriasis, CAD (s/p stent 2 years ago on Plavix) presenting with vomiting, weakness, diaphoresis, paresthesia (now resolved). Admitted for hypercoagulability workup for incidental finding of renal infarcts.    #Renal infarct   -Possibly 2/2 cocaine use vs. thrombus  -Incidental finding on CT A/P  -Elevated DDimer 399  -Utox (+) opioids, cocaine  -Full AC Lovenox dose x 1 in ED  -f/u b/l Dopplers to r/o DVT  -If dopplers positive, consider CTA chest/ VQ - patient with renal insufficiency  -Vascular consulted in ED, apprec recs  -f/u hypercoagulability workup  -f/u echo  -f/u cardiology, nephrology, hematology consults   -consider renal doppler    #Acute metabolic toxic encephalopathy (now resolved)  -No elevated WBC, afebrile  -CTH WNL  -UA with no signs of infection  -CXR with no focal consolidation  -CT with gallbladder distension, benign abdominal exam  -Trop WNL x2  -EKG personally reviewed- sinus bradycardia  -f/u blood culture, urine culture  -f/u RVP  -trend LFTs, Serial abdominal exam  -cardiology consult      #CKD  -Baseline Cr 1.58-1.9 since 2021  -Asymmetric bladder wall thickening, and moderate blood in urine  -Avoid nephrotoxic agents  -Bladder scan 8 hrs  -Urology reccs appreciated: f/up with cystoscopy outpatient. No inpt management unless HH unstble    #HTN  -c/w home Bystolic amlodipine    #TERENCE  -c/w CPAP at night     #CAD  -c/w home plavix, start aspirin    #Gout  -c/w allopurinol    #cocaine use  -Last use night prior to presentation    #DVT PPx  -Full AC Lovenox dose x 1    #FULL CODE       53M pmhx HTN, Obesity, TERENCE, Gout, psoriasis, CAD (s/p stent 2 years ago on Plavix) presenting with vomiting, weakness, diaphoresis, paresthesia (now resolved). Admitted for hypercoagulability workup for incidental finding of renal infarcts.    #Renal infarct   -Possibly 2/2 cocaine use vs. thrombus  -Incidental finding on CT A/P  -Elevated DDimer 399  -Utox (+) opioids, cocaine  -Full AC Lovenox dose x 1 in ED  -f/u b/l Dopplers to r/o DVT  -If dopplers positive, consider CTA chest/ VQ - patient with renal insufficiency  -Vascular consulted in ED, apprec recs: f/u hypercoagulability workup  -F/u cardiology, nephrology, hematology consults   -Consider renal doppler  -Cardiology consult  -F/up TTE    #Acute metabolic toxic encephalopathy (now resolved)  -No elevated WBC, afebrile  -CTH WNL  -UA with no signs of infection, CXR with no focal consolidation  -CT with gallbladder distension, benign abdominal exam  -Trop WNL x2  -EKG personally reviewed- sinus bradycardia  -f/u blood culture, urine culture  -RVP neg  -Trend LFTs, normalized  -Cardiology consult  -F/up abdominal US    #CKD stage III, proteinuric  -Baseline Cr 1.58-1.9 since 2021  -Asymmetric bladder wall thickening, and moderate blood in urine  -Avoid nephrotoxic agents  -Urology reccs appreciated: f/up with cystoscopy outpatient. No inpt management unless HH unstable  -Nephro reccs appreciated. CT abnormalities may be old infarcts/cocaine induced, presentation not consistent with acute renal infarct.  -Start on ARB for HTN/proteinuria  -Close f/up outpatient. Nephrologist Dr Florentino ([place]) currently in the middle of work up    #HTN  -Home Bystolic and amlodipine  -Started on losartan 25mg QD    #TERENCE  -c/w CPAP at night     #CAD  -Aspirin  -Hold plavix for now     #Gout  -c/w allopurinol    #cocaine use  -Last use night prior to presentation    #DVT PPx  -Full AC Lovenox dose x 1  -Per nephro, hold AC at this time    #FULL CODE

## 2024-01-21 NOTE — DISCHARGE NOTE PROVIDER - CARE PROVIDER_API CALL
Nic [address]  Internal Medicine  [address] [place] [address]  Phone: [phone]  Fax: [phone]  Follow Up Time:     Trevon Florentino  Nephrology  [address] [facility], [address]  [place] [address]  Phone: [phone]  Fax: [phone]  Follow Up Time:     DEJA HUTCHINSON  [address] [place] [address]  Phone: [phone]  Fax: [phone]  Follow Up Time:

## 2024-01-21 NOTE — DISCHARGE NOTE PROVIDER - CARE PROVIDERS DIRECT ADDRESSES
[email or web address]

## 2024-01-22 ENCOUNTER — RX RENEWAL (OUTPATIENT)
Age: 54
End: 2024-01-22

## 2024-01-22 ENCOUNTER — TRANSCRIPTION ENCOUNTER (OUTPATIENT)
Age: 54
End: 2024-01-22

## 2024-01-22 VITALS
TEMPERATURE: 98 F | HEART RATE: 67 BPM | RESPIRATION RATE: 13 BRPM | SYSTOLIC BLOOD PRESSURE: 155 MMHG | OXYGEN SATURATION: 97 % | DIASTOLIC BLOOD PRESSURE: 93 MMHG

## 2024-01-22 DIAGNOSIS — R39.9 UNSPECIFIED SYMPTOMS AND SIGNS INVOLVING THE GENITOURINARY SYSTEM: ICD-10-CM

## 2024-01-22 LAB
AT III ACT/NOR PPP CHRO: 96 % — SIGNIFICANT CHANGE UP (ref 85–135)
PROT C ACT/NOR PPP: 146 % — SIGNIFICANT CHANGE UP (ref 74–150)

## 2024-01-22 PROCEDURE — 80307 DRUG TEST PRSMV CHEM ANLYZR: CPT

## 2024-01-22 PROCEDURE — 85306 CLOT INHIBIT PROT S FREE: CPT

## 2024-01-22 PROCEDURE — 87086 URINE CULTURE/COLONY COUNT: CPT

## 2024-01-22 PROCEDURE — 83615 LACTATE (LD) (LDH) ENZYME: CPT

## 2024-01-22 PROCEDURE — 99222 1ST HOSP IP/OBS MODERATE 55: CPT

## 2024-01-22 PROCEDURE — 80053 COMPREHEN METABOLIC PANEL: CPT

## 2024-01-22 PROCEDURE — 85610 PROTHROMBIN TIME: CPT

## 2024-01-22 PROCEDURE — 82570 ASSAY OF URINE CREATININE: CPT

## 2024-01-22 PROCEDURE — 84156 ASSAY OF PROTEIN URINE: CPT

## 2024-01-22 PROCEDURE — 76700 US EXAM ABDOM COMPLETE: CPT

## 2024-01-22 PROCEDURE — G0480: CPT

## 2024-01-22 PROCEDURE — 81241 F5 GENE: CPT

## 2024-01-22 PROCEDURE — 70450 CT HEAD/BRAIN W/O DYE: CPT | Mod: MA

## 2024-01-22 PROCEDURE — 85307 ASSAY ACTIVATED PROTEIN C: CPT

## 2024-01-22 PROCEDURE — 94660 CPAP INITIATION&MGMT: CPT

## 2024-01-22 PROCEDURE — 85379 FIBRIN DEGRADATION QUANT: CPT

## 2024-01-22 PROCEDURE — 85025 COMPLETE CBC W/AUTO DIFF WBC: CPT

## 2024-01-22 PROCEDURE — 82550 ASSAY OF CK (CPK): CPT

## 2024-01-22 PROCEDURE — 85303 CLOT INHIBIT PROT C ACTIVITY: CPT

## 2024-01-22 PROCEDURE — 82962 GLUCOSE BLOOD TEST: CPT

## 2024-01-22 PROCEDURE — 93306 TTE W/DOPPLER COMPLETE: CPT

## 2024-01-22 PROCEDURE — 96365 THER/PROPH/DIAG IV INF INIT: CPT

## 2024-01-22 PROCEDURE — 86146 BETA-2 GLYCOPROTEIN ANTIBODY: CPT

## 2024-01-22 PROCEDURE — 86147 CARDIOLIPIN ANTIBODY EA IG: CPT

## 2024-01-22 PROCEDURE — 83690 ASSAY OF LIPASE: CPT

## 2024-01-22 PROCEDURE — 0225U NFCT DS DNA&RNA 21 SARSCOV2: CPT

## 2024-01-22 PROCEDURE — 96375 TX/PRO/DX INJ NEW DRUG ADDON: CPT

## 2024-01-22 PROCEDURE — 84484 ASSAY OF TROPONIN QUANT: CPT

## 2024-01-22 PROCEDURE — 85300 ANTITHROMBIN III ACTIVITY: CPT

## 2024-01-22 PROCEDURE — 81001 URINALYSIS AUTO W/SCOPE: CPT

## 2024-01-22 PROCEDURE — 85027 COMPLETE CBC AUTOMATED: CPT

## 2024-01-22 PROCEDURE — 83090 ASSAY OF HOMOCYSTEINE: CPT

## 2024-01-22 PROCEDURE — 85730 THROMBOPLASTIN TIME PARTIAL: CPT

## 2024-01-22 PROCEDURE — 81240 F2 GENE: CPT

## 2024-01-22 PROCEDURE — 36415 COLL VENOUS BLD VENIPUNCTURE: CPT

## 2024-01-22 PROCEDURE — 96361 HYDRATE IV INFUSION ADD-ON: CPT

## 2024-01-22 PROCEDURE — 74177 CT ABD & PELVIS W/CONTRAST: CPT | Mod: MA

## 2024-01-22 PROCEDURE — 93970 EXTREMITY STUDY: CPT

## 2024-01-22 PROCEDURE — 99239 HOSP IP/OBS DSCHRG MGMT >30: CPT | Mod: GC

## 2024-01-22 PROCEDURE — 71045 X-RAY EXAM CHEST 1 VIEW: CPT

## 2024-01-22 PROCEDURE — 87040 BLOOD CULTURE FOR BACTERIA: CPT

## 2024-01-22 PROCEDURE — 99285 EMERGENCY DEPT VISIT HI MDM: CPT

## 2024-01-22 PROCEDURE — 93005 ELECTROCARDIOGRAM TRACING: CPT

## 2024-01-22 RX ORDER — ASPIRIN/CALCIUM CARB/MAGNESIUM 324 MG
1 TABLET ORAL
Qty: 0 | Refills: 0 | DISCHARGE
Start: 2024-01-22

## 2024-01-22 RX ORDER — LOSARTAN POTASSIUM 100 MG/1
1 TABLET, FILM COATED ORAL
Qty: 0 | Refills: 0 | DISCHARGE
Start: 2024-01-22

## 2024-01-22 RX ADMIN — Medication 200 MILLIGRAM(S): at 11:12

## 2024-01-22 RX ADMIN — AMLODIPINE BESYLATE 5 MILLIGRAM(S): 2.5 TABLET ORAL at 05:30

## 2024-01-22 RX ADMIN — LOSARTAN POTASSIUM 25 MILLIGRAM(S): 100 TABLET, FILM COATED ORAL at 05:30

## 2024-01-22 RX ADMIN — CLOPIDOGREL BISULFATE 75 MILLIGRAM(S): 75 TABLET, FILM COATED ORAL at 11:12

## 2024-01-22 RX ADMIN — NEBIVOLOL HYDROCHLORIDE 10 MILLIGRAM(S): 5 TABLET ORAL at 05:30

## 2024-01-22 RX ADMIN — Medication 81 MILLIGRAM(S): at 11:13

## 2024-01-22 NOTE — PHARMACOTHERAPY INTERVENTION NOTE - [id number]
Onset: this morning  Location/description: mom states pt has a rash that started on stomach and has now spread just slightly to legs and arms and back- states red/pink in color- not raised- mom states pt had shots on 4/25 and did have a fever for the last 2 days- no fever present today- no other cold symptoms or changes noted- pt acting fine  Associated Symptoms: see above  What improves/worsens symptoms: nothing attempted  Symptom specific medications: none  Input and Output: eating, drinking, voiding fine  Activity level: normal  Temperature (route and time): no fever  Weight:   Wt Readings from Last 1 Encounters:   04/25/19 10.9 kg (84 %, Z= 1.01)*     * Growth percentiles are based on WHO (Boys, 0-2 years) data.      Recent Care: 4/25/19    Based on description and no other symptoms present at this time- could be from immunizations. Advised to monitor and if anything changes- call back. Advised not contagious and nothing needs to be done. Patient acting normal.  Mom expressed understanding.    Reason for Disposition  • [1] Fine pink rash AND [2] 6-12 days after measles vaccine    Protocols used: RASH OR REDNESS - [id number]       Patient Education - Other

## 2024-01-22 NOTE — CONSULT NOTE ADULT - PROBLEM SELECTOR RECOMMENDATION 9
Abnormal renal finding on initial CT scan with ?of renal infarcts-not clear on f/u renal US imaging, though patient at risk for vascular disease with cocaine use. Hyercoag. eval. initiated by hospital team-pending. If renal infarcts confirmed on f/u imaging, further hypercoag. eval. could be done after recovery from acute event. Nephrology input appreciated.

## 2024-01-22 NOTE — CONSULT NOTE ADULT - SUBJECTIVE AND OBJECTIVE BOX
MADELYN BALDWIN  53y  Male  Admitting: [facility] Olivo    HPI:  Patient is a 53M pmhx HTN, TERENCE, Gout, psoriasis, CAD (s/p stent 2 years ago on Plavix) presenting with vomiting, weakness. Per patient and wife, patient was in his usual state of health when he mistakenly ate glue and shortly after became nauseous diaphoretic and weak. Per wife, patient's eyes "did not look right" and he seemed to not be himself.   In the hospital patient had multiple bouts of vomiting.   Denies measured fever, abdominal pain, diarrhea, cough, SOB, chest pain, palpitations, burning with urination.   Denies personal history of strokes, clots, MI.   Father had a history of bloods clots, MI, stroke after COVID.   Denies alcohol use, quit smoking 2008, no IV drug use.   Hematology consulted for ?renal infarcts.    PAST MEDICAL & SURGICAL HISTORY:  Hypertension      Arthritis  Left Knee      Osteoarthrosis, localized  Localized Primary - Lower Leg      Obesity      MRSA (methicillin resistant staph aureus) culture positive  Nasal culutre from 9/30/14      Gout      S/P knee surgery  Left Knee Arthroscopic 29 years ago as teenager - then a second one around age 18      MEDICATIONS  (STANDING):  allopurinol 200 milliGRAM(s) Oral daily  amLODIPine   Tablet 5 milliGRAM(s) Oral daily  aspirin  chewable 81 milliGRAM(s) Oral daily  atorvastatin 10 milliGRAM(s) Oral at bedtime  clopidogrel Tablet 75 milliGRAM(s) Oral daily  influenza   Vaccine 0.5 milliLiter(s) IntraMuscular once  losartan 25 milliGRAM(s) Oral daily  nebivolol 10 milliGRAM(s) Oral daily    MEDICATIONS  (PRN):  acetaminophen     Tablet .. 650 milliGRAM(s) Oral every 6 hours PRN Temp greater or equal to 38C (100.4F), Mild Pain (1 - 3)  aluminum hydroxide/magnesium hydroxide/simethicone Suspension 30 milliLiter(s) Oral every 4 hours PRN Dyspepsia  melatonin 3 milliGRAM(s) Oral at bedtime PRN Insomnia  ondansetron Injectable 4 milliGRAM(s) IV Push every 8 hours PRN Nausea and/or Vomiting    Allergies    No Known Allergies    FAMILY HISTORY: F-clots with COVID      SOCIAL HISTORY: No EtOH    REVIEW OF SYSTEMS:    CONSTITUTIONAL: No fevers or chills  EYES/ENT: No visual changes;  No vertigo or throat pain   NECK: No pain or stiffness  RESPIRATORY: No cough, wheezing, hemoptysis; No shortness of breath  CARDIOVASCULAR: No chest pain or palpitations  GASTROINTESTINAL: No abdominal or epigastric pain. No hematemesis; No melena or hematochezia.  GENITOURINARY: No c/o dysuria, frequency or hematuria  NEUROLOGICAL: No numbness   SKIN: No itching, burning, rashes, or lesions   All other review of systems is negative unless indicated above.        T(F): 98.6 (01-22-24 @ 05:26), Max: 98.6 (01-22-24 @ 05:26)  HR: 72 (01-22-24 @ 05:26)  BP: 164/88 (01-22-24 @ 05:26)  RR: 13 (01-22-24 @ 05:26)  SpO2: 96% (01-22-24 @ 05:26)      GENERAL: NAD, well-developed  HEAD:  Atraumatic, Normocephalic  EYES: EOMI, PERRLA, conjunctiva and sclera clear  NECK: Supple, No JVD  CHEST/LUNG: Clear to auscultation ant.; No wheeze  HEART: Regular rate and rhythm; No murmurs, rubs, or gallops  ABDOMEN: Soft, Nontender, Nondistended; Bowel sounds present  EXTREMITIES:  2+ Peripheral Pulses, No clubbing, cyanosis, or edema  NEUROLOGY: awake, alert  SKIN: No rashes       Labs:             12.9   7.90  )-----------( 280      ( 01-21 @ 07:35 )             40.9                13.5   8.98  )-----------( 309      ( 01-20 @ 15:03 )             40.1       01-21    143  |  106  |  31<H>  ----------------------------<  78  3.8   |  28  |  1.84<H>    Ca    9.0      21 Jan 2024 07:35    TPro  7.2  /  Alb  3.0<L>  /  TBili  0.6  /  DBili  x   /  AST  19  /  ALT  16  /  AlkPhos  81  01-21      PT/INR - ( 20 Jan 2024 21:50 )   PT: 11.1 sec;   INR: 0.97 ratio         PTT - ( 20 Jan 2024 21:50 )  PTT:34.5 sec    Consultant notes reviewed : YES [x ] ; NO [ ]      Radiology and additional tests:  < from: US Duplex Venous Lower Ext Complete, Bilateral (01.21.24 @ 15:41) >  IMPRESSION:  No evidence of deep venous thrombosis in either lower extremity.    < end of copied text >  < from: CT Abdomen and Pelvis w/ IV Cont (01.20.24 @ 19:05) >  IMPRESSION:  Indeterminate bilateral renal lesions . Follow-up ultrasound versus CT or   MRI with and without contrast is recommended.    Query linear areas of decreased enhancement in both kidneys. Correlate   with urinalysis and clinical to exclude mild pyelonephritis versus renal   infarcts. Diagnosis includes    Asymmetric urinary bladder wall thickening. Correlation with urinalysis   and clinical findings is recommended.    Mild prostatomegaly.    < end of copied text >      < end of copied text >  < from: US Abdomen Complete (US Abdomen Complete .) (01.21.24 @ 15:47) >  IMPRESSION:  Mild right hydronephrosis.      < end of copied text >    
NEPHROLOGY CONSULTATION    CHIEF COMPLAINT:  CKD      HPI:  He came to ER with nausea and vomiting and dizziness.  States he ate "hot glue" by accident.  Tox screen positive for opiate and cocaine.  States he sees nephrologist in [place] (Dr. Florentino) and is in middle of a workup.  His Cr has been running 1.4-1.9 for years but slowly worse.  Denies chronic UTI, pyelonephritis, stones, blood clots, flank pain or gross hematuria.        PAST MEDICAL & SURGICAL HISTORY:  Hypertension    Arthritis  Left Knee    Osteoarthrosis, localized  Localized Primary - Lower Leg    Obesity    MRSA (methicillin resistant staph aureus) culture positive  Nasal culutre from 9/30/14      Gout      S/P knee surgery  Left Knee Arthroscopic 29 years ago as teenager - then a second one around age 18            Social History:  Lives with wife and son (20 Jan 2024 22:00)          Home Medications:  allopurinol 200 mg oral tablet: 1 tab(s) orally once a day (20 Jan 2024 22:37)  amLODIPine 5 mg oral tablet: 1 tab(s) orally once a day (15 Oct 2021 15:33)  atorvastatin:  (15 Oct 2021 15:33)  Bystolic 10 mg oral tablet: 1 tab(s) orally once a day (15 Oct 2021 15:33)  Plavix 75 mg oral tablet: 1 tab(s) orally once a day (20 Jan 2024 22:37)      MEDICATIONS  (STANDING):  allopurinol 200 milliGRAM(s) Oral daily  amLODIPine   Tablet 5 milliGRAM(s) Oral daily  aspirin  chewable 81 milliGRAM(s) Oral daily  atorvastatin 10 milliGRAM(s) Oral at bedtime  clopidogrel Tablet 75 milliGRAM(s) Oral daily  influenza   Vaccine 0.5 milliLiter(s) IntraMuscular once  nebivolol 10 milliGRAM(s) Oral daily      PHYSICAL EXAMINATION:    Conversant, no apparent distress  PERRLA, pink conjunctivae, no ptosis  Good dentition, no pharyngeal erythema  Neck non tender, no mass, no thyromegaly or nodules  Normal respiratory effort, lungs clear to auscultation  Heart with RRR, no murmurs or rubs, no peripheral edema  Abdomen soft, no masses, no organomegaly  Skin no rashes, ulcers or lesions, normal turgor and temperature  Appropriate affect, AO x 3    LABS:                        12.9   7.90  )-----------( 280      ( 21 Jan 2024 07:35 )             40.9     01-21    143  |  106  |  31<H>  ----------------------------<  78  3.8   |  28  |  1.84<H>    Ca    9.0      21 Jan 2024 07:35    TPro  7.2  /  Alb  3.0<L>  /  TBili  0.6  /  DBili  x   /  AST  19  /  ALT  16  /  AlkPhos  81  01-21    Urinalysis Basic - ( 21 Jan 2024 07:35 )  300 protein, mod blood, 10 RBC, P/C 1.7          RADIOLOGY:  CT scan personally reviewed:  it was without contrast and shows multiple small foci of lower density in region of pyraminds      ASSESSMENT:  1. CKD - stage 3b - proteinuric, etiology not clear, in middle of a workup by outpatient nephrologist  2. Abnormalities on non contrast CT imaging may be old infarcts, probably related to cocaine;  clinical presentation not consistent with acute renal infarct    PLAN:  Would not anticoagulate at this time, but elective hypercoaguable workup is reasonable  He should be on ARB given HTN and proteinuria  Outpatient nephrology follow up advised                  
Chief Complaint:   Patient is a 53M pmhx HTN, Obesity, TERENCE, Gout, psoriasis, CAD (s/p stent 2 years ago on Plavix) presenting with vomiting, weakness. Per patient and wife, patient was in his usual state of health when he mistakenly ate glue and shortly after became nauseous diaphoretic and weak. Per wife, patient's eyes "did not look right" and he seemed to not be himself. In the hospital patient had multiple bouts of vomiting. Denies measured fever, abdominal pain, diarrhea, cough, SOB< chest pain, palpitations, burning with urination.   Patient had normal colonoscopy Denies personal history of strokes, clots, MI. Father had a history of bloods clots, MI, stroke after COVID. Takes all medications as prescribed. Patient was on mounjaro but it was dc due to kidney function. No Fhx cancer.   Denies alcohol use, quit smoking 2008, no IV drug use.  In ED afebrile P: 70 BP: 157/77 sat 96% on RA. Patient received 2L NS bolus, Zofran, Reglan. cardio eval requested for a hypercoagulable state.   HPI:    PMH:   Hypertension    Arthritis    Osteoarthrosis, localized    Obesity    MRSA (methicillin resistant staph aureus) culture positive    Gout      PSH:   S/P knee surgery      Family History:  FAMILY HISTORY:  No significant family history (Father, Mother)  Mother & Father - Alive and well        Social History:  Smoking:  Alcohol:  Drugs:    Allergies:  No Known Allergies      Medications:  acetaminophen     Tablet .. 650 milliGRAM(s) Oral every 6 hours PRN  allopurinol 200 milliGRAM(s) Oral daily  aluminum hydroxide/magnesium hydroxide/simethicone Suspension 30 milliLiter(s) Oral every 4 hours PRN  amLODIPine   Tablet 5 milliGRAM(s) Oral daily  aspirin  chewable 81 milliGRAM(s) Oral daily  atorvastatin 10 milliGRAM(s) Oral at bedtime  clopidogrel Tablet 75 milliGRAM(s) Oral daily  influenza   Vaccine 0.5 milliLiter(s) IntraMuscular once  losartan 25 milliGRAM(s) Oral daily  melatonin 3 milliGRAM(s) Oral at bedtime PRN  nebivolol 10 milliGRAM(s) Oral daily  ondansetron Injectable 4 milliGRAM(s) IV Push every 8 hours PRN      REVIEW OF SYSTEMS:  CONSTITUTIONAL: No fever, weight loss, or fatigue  EYES: No eye pain, visual disturbances, or discharge  ENMT:  No difficulty hearing, tinnitus, vertigo; No sinus or throat pain  NECK: No pain or stiffness  BREASTS: No pain, masses, or nipple discharge  RESPIRATORY: No cough, wheezing, chills or hemoptysis; No shortness of breath  CARDIOVASCULAR: No chest pain, palpitations, dizziness, or leg swelling  GASTROINTESTINAL: No abdominal or epigastric pain. No nausea, vomiting, or hematemesis; No diarrhea or constipation. No melena or hematochezia.  GENITOURINARY: No dysuria, frequency, hematuria, or incontinence  NEUROLOGICAL: No headaches, memory loss, loss of strength, numbness, or tremors  SKIN: No itching, burning, rashes, or lesions   LYMPH NODES: No enlarged glands  ENDOCRINE: No heat or cold intolerance; No hair loss  MUSCULOSKELETAL: No joint pain or swelling; No muscle, back, or extremity pain  PSYCHIATRIC: No depression, anxiety, mood swings, or difficulty sleeping  HEME/LYMPH: No easy bruising, or bleeding gums  ALLERY AND IMMUNOLOGIC: No hives or eczema    Physical Exam:  T(C): 36.2 (01-21-24 @ 11:16), Max: 37 (01-21-24 @ 05:58)  HR: 81 (01-21-24 @ 11:16) (62 - 84)  BP: 165/102 (01-21-24 @ 11:16) (139/83 - 165/102)  RR: 15 (01-21-24 @ 11:16) (10 - 20)  SpO2: 94% (01-21-24 @ 11:16) (94% - 99%)  Wt(kg): --    GENERAL: NAD, well-groomed, well-developed overweight  HEAD:  Atraumatic, Normocephalic  EYES: EOMI, conjunctiva and sclera clear  ENT: Moist mucous membranes,  NECK: Supple, No JVD, no bruits  CHEST/LUNG: Clear to percussion bilaterally; No rales, rhonchi, wheezing, or rubs  HEART: Regular rate and rhythm; No murmurs, rubs, or gallops PMI non displaced.  ABDOMEN: Soft, Nontender, Nondistended; Bowel sounds present  EXTREMITIES:  2+ Peripheral Pulses, No clubbing, cyanosis, or edema  SKIN: No rashes or lesions  NERVOUS SYSTEM:  Cranial Nerves II-XII intact    Cardiovascular Diagnostic Testing:  ECG:    < from: 12 Lead ECG (01.20.24 @ 14:20) >  Diagnosis Line Sinus bradycardia  Otherwise normal ECG  When compared with ECG of 15-OCT-2021 15:20,  No significant change was found    Confirmed by BLAISE BARRETO MD ([id number]) on 1/21/2024 8:43:49 AM    < end of copied text >      ECHO:    < from: TTE Echo Complete w/o Contrast w/ Doppler (01.21.24 @ 09:06) >    Summary:   1. Left ventricular ejection fraction, by visual estimation, is 60 to   65%.   2. Technically difficult study.   3. Normal global left ventricular systolic function.   4. Normal left atrial size.   5. Normal right atrial size.   6. Mild mitral valve regurgitation.   7. Mild thickening and calcification of the anterior and posterior   mitral valve leaflets.    [id number] Blaise Barreto , Electronically signed on 1/21/2024 at   2:49:34 PM            *** Final ***    < end of copied text >      Labs:                        12.9   7.90  )-----------( 280      ( 21 Jan 2024 07:35 )             40.9     01-21    143  |  106  |  31<H>  ----------------------------<  78  3.8   |  28  |  1.84<H>    Ca    9.0      21 Jan 2024 07:35    TPro  7.2  /  Alb  3.0<L>  /  TBili  0.6  /  DBili  x   /  AST  19  /  ALT  16  /  AlkPhos  81  01-21    PT/INR - ( 20 Jan 2024 21:50 )   PT: 11.1 sec;   INR: 0.97 ratio         PTT - ( 20 Jan 2024 21:50 )  PTT:34.5 sec  CARDIAC MARKERS ( 21 Jan 2024 07:35 )  x     / x     / 191 U/L / x     / x          Imaging:    < from: Xray Chest 1 View AP/PA (01.20.24 @ 15:13) >  IMPRESSION:  1. Cardiac silhouette may be magnified by technique.  2. Clear lungs with no infiltrates, pleural effusions or CHF.    --- End of Report ---            GRANT LUGO MD; Attending Radiologist  This document has been electronically signed. Jan 20 2024  3:18PM    < end of copied text >        impression  coronary artery disease  Positive cocaine positive opiates BUN 31 creatinine 1.8 hemoglobin 12.9 hematocrit 41 chest x-ray negative D-dimer 399 troponin 15/18 CT abdomen?  Pyelo-?  Renal infarcts CT head negative echocardiograph preserved LV function 12/3/2020 mpi ejection fraction 44% medium sized moderate defect in inferior wall that was fixed suggestive of diaphragmatic attenuation artifact no definite evidence for vasodilator induced myocardial ischemia .    Zaid underwent cardiac stent 1 year ago. given He has a history of hypertension and a family history of coronary disease. He also has a rheumatologic diagnosis and is felt to have a flare that being said given renal infarct which screen for thromboembolism with blood cultures.  I had a long discussion with Mr. Palomino about the risks of recreational drug use and even discussed possible aortic dissection and myocardial infarction secondary to the same. His wife is at the bedside and she states they plan on rehab. He denies chest pains or shortness of breath or ACS symptoms.    Hypercoagulable workup as per rheumatology and hematology. Would continue antiplatelets in view of a cardiac stent 1 year ago according to Dr. Frost. Seeing Dr. Ovidio Florentino for nephrology seen by Dr. Knox's 10/8/2020   patient appears to anxious for discharge and requesting outpatient workup . close follow up with his private cardiologist emphasized.       care coordinated with Dr. Frost by phone

## 2024-01-22 NOTE — CONSULT NOTE ADULT - ASSESSMENT
Patient is a 53M pmhx HTN, TERENCE, Gout, psoriasis, CAD (s/p stent 2 years ago on Plavix) presenting with vomiting, weakness. Per patient and wife, patient was in his usual state of health when he mistakenly ate glue and shortly after became nauseous diaphoretic and weak. Per wife, patient's eyes "did not look right" and he seemed to not be himself.   In the hospital patient had multiple bouts of vomiting.   Denies measured fever, abdominal pain, diarrhea, cough, SOB, chest pain, palpitations, burning with urination.   Denies personal history of strokes, clots, MI.   Father had a history of bloods clots, MI, stroke after COVID.   Denies alcohol use, quit smoking 2008, no IV drug use.   Hematology consulted for ?renal infarcts.

## 2024-01-22 NOTE — PROGRESS NOTE ADULT - ATTENDING COMMENTS
Agree with resident plan above, please see note for full details of the service      PE: A+Ox3, no FND, lungs CTA b/l, no murmurs, abd NT/ND, no CVA tenderness, no lower extremity swelling.      Assessment:   - Acute metabolic encephalopathy/transient episode of dizziness/weakness/diaphoresis (now resolved) possibly secondary to eating glue? Inhalational effect? Cocaine?  - Kidney lesions possibly cysts vs. renal infarcts in the setting of cocaine use   - Elevated d-dimer   - Hypokalemia  - Hyperglycemia  - Microscopic hematuria, proteinuria  - Incidental findings of hepatomegaly, splenomegaly, distended gallbladder, bladder wall thickening, distal esophageal thickening, mild prostatomegaly  - Utox positive for cocaine and opiates   - Hypoalbuminemia   - Hx of HTN, obesity, TERENCE on CAP, psoriasis, CAD s/p stent, CKD       Plan  - Pt is adamant about going home, will d/c home today as further workup can be completed outpatient   - Lower extremity dopplers: negative for DVT   - Started on ARB yesterday, titrate up outpatient based on BP and proteinuria   - ECHO: EF 60-65%, no vegetations   - Abdominal US: mild right hydronephrosis, contracted gallbladder   - Hypercoagulable workup and BCx x 2 to be followed up outpatient   - Outpt f/u with cystoscopy/workup for microscopic hematuria/prostatomegaly an GI outpt for hepatosplenomegaly   - Spoke to nephrology, cardiology, and heme/onc - no anticoagulation at this time   - Counseled on cessation of cocaine   - D/C today     I spent a total of 40 minutes on the date of this encounter coordinating the patient's care/discharge. This includes reviewing results/imaging and discussions with specialists, nursing, case management/social work. Further tests, medications, and procedures have been ordered as indicated. Results and the plan of care were communicated to the patient and/or their family member. Supporting documentation was completed and added to the patient's chart.

## 2024-01-22 NOTE — DISCHARGE NOTE NURSING/CASE MANAGEMENT/SOCIAL WORK - PATIENT PORTAL LINK FT
You can access the FollowMyHealth Patient Portal offered by [facility] by registering at the following website: [email or web address]. By joining [facility]’s FollowMyHealth portal, you will also be able to view your health information using other applications (apps) compatible with our system.

## 2024-01-22 NOTE — PROGRESS NOTE ADULT - ASSESSMENT
53M pmhx HTN, Obesity, TERENCE, Gout, psoriasis, CAD (s/p stent 2 years ago on Plavix) presenting with vomiting, weakness, diaphoresis, paresthesia (now resolved). Admitted for hypercoagulability workup for incidental finding of renal infarcts.    #Renal infarct   -Possibly 2/2 cocaine use vs. thrombus - likely old  -Incidental finding on CT A/P  -Elevated DDimer 399, negative doppler CTA  -Utox (+) opioids, cocaine  -Hypercoagulability workup started, pending results. Patient pressing to leave, will follow with outpatient providers for further workup.      #Acute metabolic toxic encephalopathy (now resolved)  -Resolved    #CKD stage III, proteinuric  -Baseline Cr 1.58-1.9 since 2021  -Asymmetric bladder wall thickening, and moderate blood in urine  -Avoid nephrotoxic agents  -Urology reccs appreciated: f/up with cystoscopy outpatient. No inpt management unless HH unstable  -Nephro reccs appreciated. CT abnormalities may be old infarcts/cocaine induced, presentation not consistent with acute renal infarct.  -Start on ARB for HTN/proteinuria  -Close f/up outpatient. Nephrologist Dr Florentino ([facility]) currently in the middle of work up    #HTN  -Home Bystolic and amlodipine  -Started on losartan 25mg QD    #TERENCE  -c/w CPAP at night     #CAD  -Aspirin  -Hold plavix for now     #Gout  -c/w allopurinol    #cocaine use  -Last use night prior to presentation    #DVT PPx  -Full AC Lovenox dose x 1  -Per nephro, hold AC at this time    #FULL CODE    DC

## 2024-01-22 NOTE — PROGRESS NOTE ADULT - SUBJECTIVE AND OBJECTIVE BOX
52yo M with history of HTN, Obesity, TERENCE, Gout, psoriasis, CAD (s/p stent 2 years ago on Plavix) presenting with vomiting, weakness, diaphoresis, paresthesia (now resolved). Admitted for hypercoagulability workup for incidental finding of renal infarcts.    Evaluated patient in the morning, resting comfortably in bed. No complaints. DC today    PHYSICAL EXAM:  Vital Signs Last 24 Hrs  T(C): 36.8 (22 Jan 2024 10:58), Max: 37 (22 Jan 2024 05:26)  T(F): 98.3 (22 Jan 2024 10:58), Max: 98.6 (22 Jan 2024 05:26)  HR: 67 (22 Jan 2024 10:58) (64 - 72)  BP: 155/93 (22 Jan 2024 10:58) (150/79 - 164/88)  BP(mean): --  RR: 13 (22 Jan 2024 10:58) (13 - 13)  SpO2: 97% (22 Jan 2024 10:58) (96% - 97%)    Parameters below as of 22 Jan 2024 10:58  Patient On (Oxygen Delivery Method): room air        PHYSICAL EXAM:  GENERAL: NAD, lying in bed comfortably, obese  HEAD:  Atraumatic, Normocephalic  EYES: EOMI, conjunctiva and sclera clear  ENT: Moist mucous membranes  NECK: Supple, No JVD  RESP: Clear to auscultation bilaterally, good air entry bilaterally; No wheezing, rales, or rhonchi. Unlabored respirations  CARDIAC: Regular rate and rhythm. S1 and S2. No murmurs, rubs, or gallops  GI:  Soft, Nontender, Nondistended. Bowel sounds present x4 quadrants; No hepatomegaly. No splenomegaly.  EXTREMITIES:  2+ Peripheral Pulses. Capillary refill <2 seconds. No clubbing, cyanosis, or edema  NERVOUS SYSTEM:  Alert & Oriented X3, speech clear. No deficits   MSK: FROM all 4 extremities, full and equal strength  SKIN: psoariatic coin shaped lesions of different sizes throughout neck      Labs:                     12.9   7.90  )-----------( 280      ( 21 Jan 2024 07:35 )             40.9     01-21  143  |  106  |  31<H>  ----------------------------<  78  3.8   |  28  |  1.84<H>  Ca    9.0      21 Jan 2024 07:35  TPro  7.2  /  Alb  3.0<L>  /  TBili  0.6  /  DBili  x   /  AST  19  /  ALT  16  /  AlkPhos  81  01-21          Urinalysis Basic - ( 21 Jan 2024 07:35 )  Color: x / Appearance: x / SG: x / pH: x  Gluc: 78 mg/dL / Ketone: x  / Bili: x / Urobili: x   Blood: x / Protein: x / Nitrite: x   Leuk Esterase: x / RBC: x / WBC x   Sq Epi: x / Non Sq Epi: x / Bacteria: x      PT/INR - ( 20 Jan 2024 21:50 )   PT: 11.1 sec;   INR: 0.97 ratio    PTT - ( 20 Jan 2024 21:50 )  PTT:34.5 sec  CARDIAC MARKERS ( 21 Jan 2024 07:35 )  x     / x     / 191 U/L / x     / x        CAPILLARY BLOOD GLUCOSE  POCT Blood Glucose.: 116 mg/dL (20 Jan 2024 14:19)      Xray Chest 1 View AP/PA:   ACC: [id number] EXAM:  XR CHEST AP OR PA 1V   ORDERED BY: KYLE TERAN     IMPRESSION:  1. Cardiac silhouette may be magnified by technique.  2. Clear lungs with no infiltrates, pleural effusions or CHF.      CT Abdomen and Pelvis w/ IV Cont (01.20.24 @ 19:05)  FINDINGS:  LOWER CHEST: Trace aortic valve calcification. Clear lung bases.    LIVER: Hepatomegaly 23.3 cm cephalocaudal length.  BILE DUCTS: Normal caliber.  GALLBLADDER: Distended.  SPLEEN: Splenomegaly 14.6 cm cephalocaudal length  PANCREAS: Within normal limits.  ADRENALS: Within normal limits.  KIDNEYS/URETERS: There are multiple intermediate density lesions in both   kidneys largest 1.6 cm. Suggestion of of linear areas of decreased   attenuation in bilateral renal cortex. Dilated right extrarenal pelvis.   Focally dilated bilateral pelvic ureters.    BLADDER: Asymmetric urinary bladder wall thickening may represent   cystitis, urothelial neoplasm, or urinary bladder outlet obstruction.  REPRODUCTIVE ORGANS: Prostatomegaly.    BOWEL: No bowel obstruction. Appendix is normal. Bilateral rectosigmoid   colon fat stranding. Mild distal esophageal wall thickening. Moderate   amount of colonic stool may represent constipation.  PERITONEUM: No ascites.  VESSELS: Within normal limits.  RETROPERITONEUM/LYMPH NODES: Slightly prominent bilateral external iliac   lymph nodes.  ABDOMINAL WALL: Tiny fat-containing umbilical hernia.  BONES: Multilevel degenerative changes. Grade 1 retrolisthesis L5 over S1.    IMPRESSION:  Indeterminate bilateral renal lesions . Follow-up ultrasound versus CT or   MRI with and without contrast is recommended.    Query linear areas of decreased enhancement in both kidneys. Correlate   with urinalysis and clinical to exclude mild pyelonephritis versus renal   infarcts. Diagnosis includes    Asymmetric urinary bladder wall thickening. Correlation with urinalysis   and clinical findings is recommended.    Mild prostatomegaly.    Additional findings noted above.        GRANT LUGO MD; Attending Radiologist  This document has been electronically signed. Jan 20 2024  3:18PM (01-20-24 @ 15:13)

## 2024-01-23 LAB
APCR PPP: 2.44 RATIO — SIGNIFICANT CHANGE UP
B2 GLYCOPROT1 AB SER QL: NEGATIVE — SIGNIFICANT CHANGE UP
CARDIOLIPIN AB SER-ACNC: NEGATIVE — SIGNIFICANT CHANGE UP

## 2024-01-24 LAB
DNA PLOIDY SPEC FC-IMP: SIGNIFICANT CHANGE UP
PROT S FREE PPP-ACNC: 94 % — SIGNIFICANT CHANGE UP (ref 63–140)
PTR INTERPRETATION: SIGNIFICANT CHANGE UP

## 2024-01-25 LAB
ALFENTANIL UR QUANT: SIGNIFICANT CHANGE UP NG/MG CREAT
CREAT UR-MCNC: 146 MG/DL — SIGNIFICANT CHANGE UP
FENTANYL UR CFM-MCNC: 35 NG/MG CREAT — SIGNIFICANT CHANGE UP
FENTANYL UR CFM-MCNC: ABNORMAL
NORFENTANYL UR-MCNC: 92 NG/MG CREAT — SIGNIFICANT CHANGE UP
SUFENTANIL UR QUANT: SIGNIFICANT CHANGE UP NG/MG CREAT

## 2024-01-27 LAB
CULTURE RESULTS: SIGNIFICANT CHANGE UP
SPECIMEN SOURCE: SIGNIFICANT CHANGE UP

## 2024-02-01 LAB
CODEINE UR CFM-MCNC: 357 — SIGNIFICANT CHANGE UP
CODEINE UR QL: SIGNIFICANT CHANGE UP
MORPHINE UR CFM-MCNC: 923 — SIGNIFICANT CHANGE UP
MORPHINE UR QL: SIGNIFICANT CHANGE UP
OPIATES UR QL: SIGNIFICANT CHANGE UP

## 2024-02-10 NOTE — PATIENT PROFILE ADULT - FALL HARM RISK - CONCLUSION
Patient Seen in: [facility]      History     Chief Complaint   Patient presents with    Urinary Symptoms     Stated Complaint: Urinary Symptoms    Subjective:   HPI    75-year-old gentleman.  Just the past 24 hours patient has developed urinary urgency, frequency and burning.  Had 1 previous episode of  pyelonephritis that required hospitalization.  He has learned to come in sooner rather than later.  His blood sugars have been well-controlled recently.    Objective:   Past Medical History:   Diagnosis Date    Abdominal hernia     See my chart    Abnormal stress test 3/20/2014    Atrial fibrillation (HCC)     Blood in the stool Test date    BPH (benign prostatic hypertrophy)     COVID     Diabetes mellitus (HCC)     Type 2?    Diverticulosis     Erectile dysfunction     Hearing impairment     bilateral hearing aids    Hearing loss     High cholesterol     Years ago    Temporal arteritis (HCC) 2/7/2019    Type II or unspecified type diabetes mellitus without mention of complication, not stated as uncontrolled     Unspecified essential hypertension     Visual impairment     glasses    Wears glasses               Past Surgical History:   Procedure Laterality Date    COLONOSCOPY      HERNIA SURGERY                  Social History     Socioeconomic History    Marital status:    Tobacco Use    Smoking status: Never    Smokeless tobacco: Never   Vaping Use    Vaping Use: Never used   Substance and Sexual Activity    Alcohol use: No    Drug use: No   Other Topics Concern    Caffeine Concern Yes    Special Diet Yes     Comment: diabetic diet              Review of Systems    Positive for stated complaint: Urinary Symptoms  Other systems are as noted in HPI.  Constitutional and vital signs reviewed.      All other systems reviewed and negative except as noted above.    Physical Exam     ED Triage Vitals [02/10/24 1130]   BP (!) 169/49   Pulse 66   Resp 18   Temp 97.4 °F (36.3 °C)   Temp src Temporal    SpO2 98 %   O2 Device None (Room air)       Current:BP (!) 169/49   Pulse 66   Temp 97.4 °F (36.3 °C) (Temporal)   Resp 18   Ht 170.2 cm (5' 7\")   Wt 83.5 kg   SpO2 98%   BMI 28.82 kg/m²         Physical Exam      Gen: Well appearing, well groomed, alert and aware x 3  ENT: Atraumatic  Lung: No distress, RR, no retraction,  Back: Full range of motion, no CVA tenderness  Abdominal: Soft exam, no distention.  No pain to palpation all 4 quadrants      ED Course     Labs Reviewed   [facility] POCT URINALYSIS DIPSTICK - Abnormal; Notable for the following components:       Result Value    Urine Clarity Cloudy (*)     Protein urine 100 (*)     Glucose, Urine >=1000 (*)     Blood, Urine Large (*)     Leukocyte esterase urine Small (*)     All other components within normal limits   URINE CULTURE, ROUTINE                      MDM            Nontoxic-appearing cheerful male.  No systemic malaise, back pain or flank pain.  Urine demonstrates small leukocytes.  Large blood.  Glucose spilling.  Will be sent for culture.  Initiate Keflex 3 times daily.  Push clear fluids.  If urine culture dictates a change in therapy, you will be contacted.  Antibiotic as written.  Return for worsening                               Medical Decision Making      Disposition and Plan     Clinical Impression:  1. Cystitis         Disposition:  Discharge  2/10/2024 11:54 am    Follow-up:  Buck Arriola MD  [address] [place] [id number] [phone]                Medications Prescribed:  Current Discharge Medication List        START taking these medications    Details   cephalexin 500 MG Oral Cap Take 1 capsule (500 mg total) by mouth 3 (three) times daily for 7 days.  Qty: 21 capsule, Refills: 0                                             Fall with Harm Risk

## 2024-02-13 ENCOUNTER — APPOINTMENT (OUTPATIENT)
Dept: RHEUMATOLOGY | Facility: CLINIC | Age: 54
End: 2024-02-13

## 2024-02-13 ENCOUNTER — APPOINTMENT (OUTPATIENT)
Dept: RHEUMATOLOGY | Facility: CLINIC | Age: 54
End: 2024-02-13
Payer: COMMERCIAL

## 2024-02-13 PROCEDURE — 99213 OFFICE O/P EST LOW 20 MIN: CPT

## 2024-02-13 NOTE — PHYSICAL EXAM
[General Appearance - Alert] : alert [General Appearance - Well Nourished] : well nourished [General Appearance - In No Acute Distress] : in no acute distress [Sclera] : the sclera and conjunctiva were normal [Neck Appearance] : the appearance of the neck was normal [] : no respiratory distress [Musculoskeletal - Swelling] : no joint swelling seen [FreeTextEntry1] : scaly rash over B/L elbows and chest. No nail pits [Oriented To Time, Place, And Person] : oriented to person, place, and time

## 2024-02-13 NOTE — REASON FOR VISIT
[Home] : at home, [unfilled] , at the time of the visit. [Other Location: e.g. Home (Enter Location, City,State)___] : at [unfilled] [Patient] : the patient [Self] : self [Follow-Up: _____] : a [unfilled] follow-up visit [FreeTextEntry1] : recurrent gout

## 2024-02-13 NOTE — ASSESSMENT
[FreeTextEntry1] : MADELYN BADLWIN is a 53 year old man with below --  # recurrent mono-oligoarticular inflammatory joint flares qmonthly, high sUA, + metabolic syndrome, suspected psoriasis, and worsening renal function, improvement with decreasing diet soda intake and addition of ULT.  # + elevated ESR and rash, do need to consider inflammatory arthritic conditions but at present clinically acting more like gout  - has lab order - repeat 3-4 weeks after increasing ULT dose  - c/w allopurinol 300mg, goal sUA <5, (suspected tophi in R PIP) - c/w colchicine PRN flare, if not resolving in 1 week then has prednisone taper to use  - Aware to avoid all NSAIDs including indomethacin - Complete renal work-up, optimize treatment plan to improve renal function if possible - Continue to avoid diet sodas, any other food triggers - encouraged to see dermatology for suspected skin psoriasis (risk factor for gout as has high cell turnover)  # persistent ESR/CRP, rash - check serologies and XRs to rule out alternative etiologies and trend ESR and uric acid  - ESR is nonspecific and may be multifactorial as elevated BMI can also elevate it - advised dermatology eval   RTC 3 months

## 2024-02-13 NOTE — CONSULT LETTER
[Dear  ___] : Dear  [unfilled], [Consult Letter:] : I had the pleasure of evaluating your patient, [unfilled]. [Please see my note below.] : Please see my note below. [Consult Closing:] : Thank you very much for allowing me to participate in the care of this patient.  If you have any questions, please do not hesitate to contact me. [FreeTextEntry3] : Julianne Rebolledo MD\par  Rheumatology\par  [facility] \par  \par  [address] [place] [address]\par  P: [phone]\par  F: [phone]\par    [Sincerely,] : Sincerely,

## 2024-02-13 NOTE — HISTORY OF PRESENT ILLNESS
[FreeTextEntry1] : MADELYN BALDWIN is a 52 year old man who presents with recurrent gout flares x 7-8 years, becoming more active in recent years, at present having 1 flare a month, in last few months now having polyarticular flares. Has had prior steroid injections with podiatry with good results, had previously used indomethacin, never given PO steroids. Recently given colchicine BID which has also been effective. Previously on Allopurinol with prior PMD, tolerated well, ran out at some point, never resumed as was told not to take while flaring and never had a flare free period long enough to start. No FH of crystalline arthritis. Never tapped, no recent images. sUA has been consistently high, worsening renal fxn over last few years as well, now getting renal w/u. No current dietary issues - refrains from red meat, shellfish, ETOH. Does note less severe flares since cutting out diet soda.  Inflammatory arthritis ROS negative for symmetrical peripheral joint synovitis, prolonged AM stiffness, enthesitis, dactylitis, psoriasis, eye inflammation, inflammatory low back pain, IBD.    ESR high   -------- 4/18/23 -- Taking meds, no SE, only 1 mild L knee flare tho ?if gout as is a TKR, responded to steroids. No current activity.   7/18/23 -- Taking ULT, reportedly taking colchicine daily instead of as prescribed, no flares until few days ago when R knee flared, started prednisone with 85% current improvement. Since last admission, polyarticular gout flare due to EtOH intake, was still in the middle of tapering steroids from last flare, has continued these, advised to continue prednisone taper, continue daily colchicine until 7/28/2023, then begin MWF dosing, C/W current allopurinol dose.   11/30/23 -- Pt says he had R ankle gout flare up 3 weeks ago, took prednisone for 3 weeks w/ resolution. Rarely eats red meat, no lobster/shrimp/alcohol/soda. Also has had breakout non-pruritic rash on chest and arms that is improving. Pending dermatology visit.  2/13/24 -- No overt flares since last visit. Mild R hand flare, improved with colchicine. Increasing Allopurinol 300mg this week, no SE. Hasn't needed prednisone.

## 2024-03-04 NOTE — PHYSICAL EXAM
[PERRL With Normal Accommodation] : pupils were equal in size, round, and reactive to light [Extraocular Movements] : extraocular movements were intact [Oropharynx] : the oropharynx was normal [] : the neck was supple [Auscultation Breath Sounds / Voice Sounds] : lungs were clear to auscultation bilaterally [FreeTextEntry1] : scaly rash over B/L elbows and chest. No nail pits

## 2024-03-04 NOTE — HISTORY OF PRESENT ILLNESS
[FreeTextEntry1] : MADELYN BALDWIN is a 52 year old man who presents with recurrent gout flares x 7-8 years, becoming more active in recent years, at present having 1 flare a month, in last few months now having polyarticular flares. Has had prior steroid injections with podiatry with good results, had previously used indomethacin, never given PO steroids. Recently given colchicine BID which has also been effective. Previously on Allopurinol with prior PMD, tolerated well, ran out at some point, never resumed as was told not to take while flaring and never had a flare free period long enough to start. No FH of crystalline arthritis. Never tapped, no recent images. sUA has been consistently high, worsening renal fxn over last few years as well, now getting renal w/u. No current dietary issues - refrains from red meat, shellfish, ETOH. Does note less severe flares since cutting out diet soda.  Inflammatory arthritis ROS negative for symmetrical peripheral joint synovitis, prolonged AM stiffness, enthesitis, dactylitis, psoriasis, eye inflammation, inflammatory low back pain, IBD.    ESR high   -------- 4/18/23 -- Taking meds, no SE, only 1 mild L knee flare tho ?if gout as is a TKR, responded to steroids. No current activity.   7/18/23 -- Taking ULT, reportedly taking colchicine daily instead of as prescribed, no flares until few days ago when R knee flared, started prednisone with 85% current improvement. Since last admission, polyarticular gout flare due to EtOH intake, was still in the middle of tapering steroids from last flare, has continued these, advised to continue prednisone taper, continue daily colchicine until 7/28/2023, then begin MWF dosing, C/W current allopurinol dose.   11/30/23 -- Pt says he had R ankle gout flare up 3 weeks ago, took prednisone for 3 weeks w/ resolution. Rarely eats red meat, no lobster/shrimp/alcohol/soda. Also has had breakout non-pruritic rash on chest and arms that is improving. Pending dermatology visit.

## 2024-03-04 NOTE — ASSESSMENT
[FreeTextEntry1] : MADELYN BALDWIN is a 53 year old man with below --  # recurrent mono-oligoarticular inflammatory joint flares qmonthly, high sUA, + metabolic syndrome, suspected psoriasis, and worsening renal function, improvement with decreasing diet soda intake --highest on DDx is recurrent gout, however in light of elevated ESR and rash, do need to consider inflammatory arthritic conditions. 6/2023 uric acid 8.5, also w/ R ankle gout flare 3 weeks ago despite three times a week colchicine - labs as below including uric acid and to eval for other possible inflammatory arthritic etiologies as still has not had done  - increase allopurinol dose to 300mg/day then repeat labs  - goal sUA <5, (suspected tophi in R PIP) - will increase colchicine (3x/week given concomitant statin use) to 0.6mg qd (since pt had gout flare on 3x/week regimen)  - PRN prednisone for flare ups - Aware to avoid all NSAIDs including indomethacin - Complete renal work-up, optimize treatment plan to improve renal function if possible - Continue to avoid diet sodas, any other food triggers - encouraged to see dermatology for suspected skin psoriasis (risk factor for gout as has high cell turnover)  # persistent ESR/CRP, rash - check serologies and XRs to rule out alternative etiologies and trend ESR and uric acid - importance of obtaining this ASAP also explained to patient - ESR is nonspecific and may be multifactorial as elevated BMI can also elevate it  RTC 2/2023 Seen with Anival Babb, Rheumatology Fellow

## 2024-03-04 NOTE — CONSULT LETTER
[FreeTextEntry3] : Julianne Rebolledo MD\par  Rheumatology\par  [facility] \par  \par  [address] [place] [address]\par  P: [phone]\par  F: [phone]\par

## 2024-04-05 ENCOUNTER — RX RENEWAL (OUTPATIENT)
Age: 54
End: 2024-04-05

## 2024-04-10 ENCOUNTER — RX RENEWAL (OUTPATIENT)
Age: 54
End: 2024-04-10

## 2024-04-12 ENCOUNTER — RX RENEWAL (OUTPATIENT)
Age: 54
End: 2024-04-12

## 2024-04-25 ENCOUNTER — OUTPATIENT (OUTPATIENT)
Dept: OUTPATIENT SERVICES | Facility: HOSPITAL | Age: 54
LOS: 1 days | End: 2024-04-25
Payer: COMMERCIAL

## 2024-04-25 VITALS
SYSTOLIC BLOOD PRESSURE: 140 MMHG | OXYGEN SATURATION: 96 % | RESPIRATION RATE: 16 BRPM | DIASTOLIC BLOOD PRESSURE: 84 MMHG | WEIGHT: 315 LBS | HEART RATE: 74 BPM | HEIGHT: 72 IN | TEMPERATURE: 98 F

## 2024-04-25 DIAGNOSIS — G56.01 CARPAL TUNNEL SYNDROME, RIGHT UPPER LIMB: ICD-10-CM

## 2024-04-25 DIAGNOSIS — Z98.89 OTHER SPECIFIED POSTPROCEDURAL STATES: Chronic | ICD-10-CM

## 2024-04-25 DIAGNOSIS — I25.10 ATHEROSCLEROTIC HEART DISEASE OF NATIVE CORONARY ARTERY WITHOUT ANGINA PECTORIS: ICD-10-CM

## 2024-04-25 DIAGNOSIS — M10.9 GOUT, UNSPECIFIED: ICD-10-CM

## 2024-04-25 DIAGNOSIS — G47.33 OBSTRUCTIVE SLEEP APNEA (ADULT) (PEDIATRIC): ICD-10-CM

## 2024-04-25 DIAGNOSIS — Z01.818 ENCOUNTER FOR OTHER PREPROCEDURAL EXAMINATION: ICD-10-CM

## 2024-04-25 DIAGNOSIS — I10 ESSENTIAL (PRIMARY) HYPERTENSION: ICD-10-CM

## 2024-04-25 DIAGNOSIS — E78.5 HYPERLIPIDEMIA, UNSPECIFIED: ICD-10-CM

## 2024-04-25 LAB
ANION GAP SERPL CALC-SCNC: 6 MMOL/L — SIGNIFICANT CHANGE UP (ref 5–17)
BUN SERPL-MCNC: 42 MG/DL — HIGH (ref 7–23)
CALCIUM SERPL-MCNC: 8.4 MG/DL — LOW (ref 8.5–10.1)
CHLORIDE SERPL-SCNC: 111 MMOL/L — HIGH (ref 96–108)
CO2 SERPL-SCNC: 28 MMOL/L — SIGNIFICANT CHANGE UP (ref 22–31)
CREAT SERPL-MCNC: 1.7 MG/DL — HIGH (ref 0.5–1.3)
EGFR: 48 ML/MIN/1.73M2 — LOW
GLUCOSE SERPL-MCNC: 128 MG/DL — HIGH (ref 70–99)
HCT VFR BLD CALC: 43.9 % — SIGNIFICANT CHANGE UP (ref 39–50)
HGB BLD-MCNC: 14 G/DL — SIGNIFICANT CHANGE UP (ref 13–17)
MCHC RBC-ENTMCNC: 28.2 PG — SIGNIFICANT CHANGE UP (ref 27–34)
MCHC RBC-ENTMCNC: 31.9 GM/DL — LOW (ref 32–36)
MCV RBC AUTO: 88.5 FL — SIGNIFICANT CHANGE UP (ref 80–100)
NRBC # BLD: 0 /100 WBCS — SIGNIFICANT CHANGE UP (ref 0–0)
PLATELET # BLD AUTO: 188 K/UL — SIGNIFICANT CHANGE UP (ref 150–400)
POTASSIUM SERPL-MCNC: 3.7 MMOL/L — SIGNIFICANT CHANGE UP (ref 3.5–5.3)
POTASSIUM SERPL-SCNC: 3.7 MMOL/L — SIGNIFICANT CHANGE UP (ref 3.5–5.3)
RBC # BLD: 4.96 M/UL — SIGNIFICANT CHANGE UP (ref 4.2–5.8)
RBC # FLD: 16.3 % — HIGH (ref 10.3–14.5)
SODIUM SERPL-SCNC: 145 MMOL/L — SIGNIFICANT CHANGE UP (ref 135–145)
WBC # BLD: 7.73 K/UL — SIGNIFICANT CHANGE UP (ref 3.8–10.5)
WBC # FLD AUTO: 7.73 K/UL — SIGNIFICANT CHANGE UP (ref 3.8–10.5)

## 2024-04-25 PROCEDURE — G0463: CPT

## 2024-04-25 PROCEDURE — 80048 BASIC METABOLIC PNL TOTAL CA: CPT

## 2024-04-25 PROCEDURE — 36415 COLL VENOUS BLD VENIPUNCTURE: CPT

## 2024-04-25 PROCEDURE — 85027 COMPLETE CBC AUTOMATED: CPT

## 2024-04-25 PROCEDURE — 93010 ELECTROCARDIOGRAM REPORT: CPT

## 2024-04-25 PROCEDURE — 93005 ELECTROCARDIOGRAM TRACING: CPT

## 2024-04-25 NOTE — H&P PST ADULT - NSICDXPASTMEDICALHX_GEN_ALL_CORE_FT
PAST MEDICAL HISTORY:  Arthritis Left Knee    BPH with urinary obstruction     CAD S/P percutaneous coronary angioplasty     Gout     [place] (hard of hearing)     Hypertension     MRSA (methicillin resistant staph aureus) culture positive Nasal culutre from 9/30/14    Obesity     TERENCE on CPAP     Osteoarthrosis, localized Localized Primary - Lower Leg

## 2024-04-25 NOTE — H&P PST ADULT - PROBLEM SELECTOR PLAN 3
LD of Prasugrel on 4/26/24- to confirm with Cardiologist LD of Prasugrel on 4/29/24- to confirm with Cardiologist

## 2024-04-25 NOTE — H&P PST ADULT - HISTORY OF PRESENT ILLNESS
54 yo M with h/o HTN, CAD with cor stents, HLD, gout, HLD, TERENCE on CPAP, Obesity (BMI 52.5) c/o bilateral hand numbness & pain R>L x 4 months. Pt had orthopedic consult- s/p X-Ray & nerve studies- carpal tunnel syndrome. Pt elected to have right carpal tunnel release on 5/7/24     *Denies any fever, chills, CP/SOB or sick contacts

## 2024-05-06 ENCOUNTER — TRANSCRIPTION ENCOUNTER (OUTPATIENT)
Age: 54
End: 2024-05-06

## 2024-05-07 ENCOUNTER — TRANSCRIPTION ENCOUNTER (OUTPATIENT)
Age: 54
End: 2024-05-07

## 2024-05-07 ENCOUNTER — OUTPATIENT (OUTPATIENT)
Dept: OUTPATIENT SERVICES | Facility: HOSPITAL | Age: 54
LOS: 1 days | End: 2024-05-07
Payer: COMMERCIAL

## 2024-05-07 VITALS
TEMPERATURE: 98 F | OXYGEN SATURATION: 99 % | HEART RATE: 71 BPM | DIASTOLIC BLOOD PRESSURE: 82 MMHG | RESPIRATION RATE: 18 BRPM | SYSTOLIC BLOOD PRESSURE: 134 MMHG

## 2024-05-07 VITALS
DIASTOLIC BLOOD PRESSURE: 81 MMHG | RESPIRATION RATE: 15 BRPM | WEIGHT: 315 LBS | OXYGEN SATURATION: 94 % | SYSTOLIC BLOOD PRESSURE: 122 MMHG | TEMPERATURE: 98 F | HEART RATE: 77 BPM

## 2024-05-07 DIAGNOSIS — Z01.818 ENCOUNTER FOR OTHER PREPROCEDURAL EXAMINATION: ICD-10-CM

## 2024-05-07 DIAGNOSIS — Z98.89 OTHER SPECIFIED POSTPROCEDURAL STATES: Chronic | ICD-10-CM

## 2024-05-07 DIAGNOSIS — G56.01 CARPAL TUNNEL SYNDROME, RIGHT UPPER LIMB: ICD-10-CM

## 2024-05-07 PROCEDURE — 64721 CARPAL TUNNEL SURGERY: CPT | Mod: RT

## 2024-05-07 RX ORDER — SODIUM CHLORIDE 9 MG/ML
1000 INJECTION, SOLUTION INTRAVENOUS
Refills: 0 | Status: DISCONTINUED | OUTPATIENT
Start: 2024-05-07 | End: 2024-05-07

## 2024-05-07 RX ORDER — OXYCODONE HYDROCHLORIDE 5 MG/1
5 TABLET ORAL ONCE
Refills: 0 | Status: DISCONTINUED | OUTPATIENT
Start: 2024-05-07 | End: 2024-05-07

## 2024-05-07 RX ORDER — HYDROMORPHONE HYDROCHLORIDE 2 MG/ML
0.5 INJECTION INTRAMUSCULAR; INTRAVENOUS; SUBCUTANEOUS
Refills: 0 | Status: DISCONTINUED | OUTPATIENT
Start: 2024-05-07 | End: 2024-05-07

## 2024-05-07 RX ORDER — ALLOPURINOL 300 MG
1 TABLET ORAL
Refills: 0 | DISCHARGE

## 2024-05-07 RX ORDER — COLCHICINE 0.6 MG
1 TABLET ORAL
Refills: 0 | DISCHARGE

## 2024-05-07 RX ORDER — CLOPIDOGREL BISULFATE 75 MG/1
1 TABLET, FILM COATED ORAL
Refills: 0 | DISCHARGE

## 2024-05-07 RX ORDER — ATORVASTATIN CALCIUM 80 MG/1
1 TABLET, FILM COATED ORAL
Refills: 0 | DISCHARGE

## 2024-05-07 RX ORDER — CEFAZOLIN SODIUM 1 G
2000 VIAL (EA) INJECTION ONCE
Refills: 0 | Status: COMPLETED | OUTPATIENT
Start: 2024-05-07 | End: 2024-05-07

## 2024-05-07 RX ORDER — ONDANSETRON 8 MG/1
4 TABLET, FILM COATED ORAL ONCE
Refills: 0 | Status: DISCONTINUED | OUTPATIENT
Start: 2024-05-07 | End: 2024-05-07

## 2024-05-07 RX ORDER — ATORVASTATIN CALCIUM 80 MG/1
1 TABLET, FILM COATED ORAL
Qty: 0 | Refills: 0 | DISCHARGE

## 2024-05-07 RX ORDER — PRASUGREL 5 MG/1
1 TABLET, FILM COATED ORAL
Refills: 0 | DISCHARGE

## 2024-05-07 RX ORDER — HYDROCODONE BITARTRATE AND ACETAMINOPHEN 7.5; 325 MG/15ML; MG/15ML
1 SOLUTION ORAL
Qty: 10 | Refills: 0
Start: 2024-05-07

## 2024-05-07 RX ORDER — AMLODIPINE BESYLATE 2.5 MG/1
1 TABLET ORAL
Refills: 0 | DISCHARGE

## 2024-05-07 RX ORDER — CEFAZOLIN SODIUM 1 G
1000 VIAL (EA) INJECTION ONCE
Refills: 0 | Status: COMPLETED | OUTPATIENT
Start: 2024-05-07 | End: 2024-05-07

## 2024-05-07 RX ADMIN — SODIUM CHLORIDE 75 MILLILITER(S): 9 INJECTION, SOLUTION INTRAVENOUS at 07:24

## 2024-05-07 RX ADMIN — SODIUM CHLORIDE 75 MILLILITER(S): 9 INJECTION, SOLUTION INTRAVENOUS at 09:16

## 2024-05-07 NOTE — ASU DISCHARGE PLAN (ADULT/PEDIATRIC) - ASU DC SPECIAL INSTRUCTIONSFT
- Keep dressing Clean and dry   - can remove bandage dressing on monday the 13th and place regular bandages on top   - pain medication as needed for severe pain, sent to the pharmacy   - follow up appointment is monday the 20th

## 2024-05-07 NOTE — ASU DISCHARGE PLAN (ADULT/PEDIATRIC) - NS MD DC FALL RISK RISK
For information on Fall & Injury Prevention, visit: [email or web address] OR  [email or web address] OR  https://www.cdc.gov/steadi/patient.html

## 2024-05-07 NOTE — ASU DISCHARGE PLAN (ADULT/PEDIATRIC) - CARE PROVIDER_API CALL
Chinmay Lan.  Orthopaedic Surgery  [address] [place] [id number]  Phone: [phone]  Fax: [phone]  Follow Up Time:

## 2024-05-15 LAB
CCP AB SER IA-ACNC: <8 UNITS
CRP SERPL-MCNC: 50 MG/L
ERYTHROCYTE [SEDIMENTATION RATE] IN BLOOD BY WESTERGREN METHOD: 95 MM/HR
RF+CCP IGG SER-IMP: NEGATIVE
RHEUMATOID FACT SER QL: 12 IU/ML

## 2024-05-16 ENCOUNTER — APPOINTMENT (OUTPATIENT)
Dept: RHEUMATOLOGY | Facility: CLINIC | Age: 54
End: 2024-05-16
Payer: COMMERCIAL

## 2024-05-16 VITALS
TEMPERATURE: 97.1 F | DIASTOLIC BLOOD PRESSURE: 86 MMHG | HEART RATE: 77 BPM | OXYGEN SATURATION: 97 % | WEIGHT: 315 LBS | SYSTOLIC BLOOD PRESSURE: 138 MMHG | RESPIRATION RATE: 22 BRPM | BODY MASS INDEX: 42.66 KG/M2 | HEIGHT: 72 IN

## 2024-05-16 DIAGNOSIS — M10.9 GOUT, UNSPECIFIED: ICD-10-CM

## 2024-05-16 DIAGNOSIS — R21 RASH AND OTHER NONSPECIFIC SKIN ERUPTION: ICD-10-CM

## 2024-05-16 DIAGNOSIS — R70.0 ELEVATED ERYTHROCYTE SEDIMENTATION RATE: ICD-10-CM

## 2024-05-16 DIAGNOSIS — M65.9 SYNOVITIS AND TENOSYNOVITIS, UNSPECIFIED: ICD-10-CM

## 2024-05-16 PROBLEM — N40.1 BENIGN PROSTATIC HYPERPLASIA WITH LOWER URINARY TRACT SYMPTOMS: Chronic | Status: ACTIVE | Noted: 2024-04-25

## 2024-05-16 PROBLEM — I25.10 ATHEROSCLEROTIC HEART DISEASE OF NATIVE CORONARY ARTERY WITHOUT ANGINA PECTORIS: Chronic | Status: ACTIVE | Noted: 2024-04-25

## 2024-05-16 PROBLEM — G47.33 OBSTRUCTIVE SLEEP APNEA (ADULT) (PEDIATRIC): Chronic | Status: ACTIVE | Noted: 2024-04-25

## 2024-05-16 PROBLEM — H91.90 UNSPECIFIED HEARING LOSS, UNSPECIFIED EAR: Chronic | Status: ACTIVE | Noted: 2024-04-25

## 2024-05-16 LAB — HLA-B27 QL NAA+PROBE: NORMAL

## 2024-05-16 PROCEDURE — G2211 COMPLEX E/M VISIT ADD ON: CPT

## 2024-05-16 PROCEDURE — 99214 OFFICE O/P EST MOD 30 MIN: CPT

## 2024-05-16 RX ORDER — ALLOPURINOL 300 MG/1
300 TABLET ORAL DAILY
Qty: 90 | Refills: 1 | Status: ACTIVE | COMMUNITY
Start: 2024-05-16 | End: 1900-01-01

## 2024-05-16 NOTE — ASSESSMENT
[FreeTextEntry1] : MADELYN BALDWIN is a 53 year old man with below --  # recurrent mono-oligoarticular inflammatory joint flares qmonthly, high sUA, + metabolic syndrome, suspected psoriasis, and worsening renal function, improvement with decreasing diet soda intake and addition of ULT.  # + elevated ESR x few years  # ?psoriatic rash, do need to consider inflammatory arthritic conditions but at present clinically acting more like gout  - labs reviewed - RF/CCP neg, ESR/CRP elevated but in setting of recent sx so will need to trend  - c/w allopurinol 300mg, goal sUA <5, (suspected tophi in R PIP) - sUA level pending  - c/w colchicine but given CKD and concomitant statin use can't stay on daily dosing - complete 2 further weeks of daily then begin MWF dosing  - Has prednisone taper PRN flare - Aware to avoid all NSAIDs including indomethacin - Following with renal - will obtain copy of note to review  - Continue to avoid diet sodas, any other food triggers - encouraged to see dermatology for suspected skin psoriasis (risk factor for gout as has high cell turnover)  RTC 3 months, labs prior

## 2024-05-16 NOTE — PHYSICAL EXAM
[General Appearance - Alert] : alert [General Appearance - In No Acute Distress] : in no acute distress [General Appearance - Well Nourished] : well nourished [Sclera] : the sclera and conjunctiva were normal [Neck Appearance] : the appearance of the neck was normal [] : no respiratory distress [Oriented To Time, Place, And Person] : oriented to person, place, and time [Musculoskeletal - Swelling] : no joint swelling seen [Auscultation Breath Sounds / Voice Sounds] : lungs were clear to auscultation bilaterally [Heart Rate And Rhythm] : heart rate was normal and rhythm regular [Heart Sounds] : normal S1 and S2 [No Spinal Tenderness] : no spinal tenderness [Abnormal Walk] : normal gait [Nail Clubbing] : no clubbing  or cyanosis of the fingernails [Motor Tone] : muscle strength and tone were normal [FreeTextEntry1] : scaly rash over B/L elbows tho resolved on chest. No nail pits [No Focal Deficits] : no focal deficits

## 2024-05-16 NOTE — REVIEW OF SYSTEMS
[Joint Pain] : joint pain [Skin Lesions] : skin lesion [Negative] : Heme/Lymph [Arthralgias] : arthralgias [Joint Swelling] : joint swelling [As Noted in HPI] : as noted in HPI [de-identified] : CTS

## 2024-05-16 NOTE — HISTORY OF PRESENT ILLNESS
[FreeTextEntry1] : MADELYN BALDWIN is a 52 year old man who presents with recurrent gout flares x 7-8 years, becoming more active in recent years, at present having 1 flare a month, in last few months now having polyarticular flares. Has had prior steroid injections with podiatry with good results, had previously used indomethacin, never given PO steroids. Recently given colchicine BID which has also been effective. Previously on Allopurinol with prior PMD, tolerated well, ran out at some point, never resumed as was told not to take while flaring and never had a flare free period long enough to start. No FH of crystalline arthritis. Never tapped, no recent images. sUA has been consistently high, worsening renal fxn over last few years as well, now getting renal w/u. No current dietary issues - refrains from red meat, shellfish, ETOH. Does note less severe flares since cutting out diet soda.  Inflammatory arthritis ROS negative for symmetrical peripheral joint synovitis, prolonged AM stiffness, enthesitis, dactylitis, psoriasis, eye inflammation, inflammatory low back pain, IBD.    ESR high   -------- 4/18/23 -- Taking meds, no SE, only 1 mild L knee flare tho ?if gout as is a TKR, responded to steroids. No current activity.   7/18/23 -- Taking ULT, reportedly taking colchicine daily instead of as prescribed, no flares until few days ago when R knee flared, started prednisone with 85% current improvement. Since last admission, polyarticular gout flare due to EtOH intake, was still in the middle of tapering steroids from last flare, has continued these, advised to continue prednisone taper, continue daily colchicine until 7/28/2023, then begin MWF dosing, C/W current allopurinol dose.   11/30/23 -- Pt says he had R ankle gout flare up 3 weeks ago, took prednisone for 3 weeks w/ resolution. Rarely eats red meat, no lobster/shrimp/alcohol/soda. Also has had breakout non-pruritic rash on chest and arms that is improving. Pending dermatology visit.  2/13/24 -- No overt flares since last visit. Mild R hand flare, improved with colchicine. Increasing Allopurinol 300mg this week, no SE. Hasn't needed prednisone.  5/16/24 -- R CTS surgery last week, gout flare over dorsum of R hand a few days after, marked improvement with medrol dose pack in pain, slowly resolving stiffness/soft tissue swelling. 1 other flare since last visit, severity and duration have decreased since ULT initiation. Pt self increased colchicine to daily, no SE. Rashes improved with topicals, still has not seen Derm.

## 2024-05-16 NOTE — CONSULT LETTER
[Dear  ___] : Dear  [unfilled], [Consult Letter:] : I had the pleasure of evaluating your patient, [unfilled]. [Please see my note below.] : Please see my note below. [Consult Closing:] : Thank you very much for allowing me to participate in the care of this patient.  If you have any questions, please do not hesitate to contact me. [Sincerely,] : Sincerely, [FreeTextEntry3] : Julianne Rebolledo MD\par  Rheumatology\par  [facility] \par  \par  [address] [place] [address]\par  P: [phone]\par  F: [phone]\par

## 2024-06-10 ENCOUNTER — RX RENEWAL (OUTPATIENT)
Age: 54
End: 2024-06-10

## 2024-06-12 ENCOUNTER — RX RENEWAL (OUTPATIENT)
Age: 54
End: 2024-06-12

## 2024-06-12 RX ORDER — PREDNISONE 5 MG/1
5 TABLET ORAL
Qty: 50 | Refills: 0 | Status: ACTIVE | COMMUNITY
Start: 2023-03-03 | End: 1900-01-01

## 2024-06-18 ENCOUNTER — APPOINTMENT (OUTPATIENT)
Dept: PULMONOLOGY | Facility: CLINIC | Age: 54
End: 2024-06-18
Payer: COMMERCIAL

## 2024-06-18 VITALS
DIASTOLIC BLOOD PRESSURE: 80 MMHG | BODY MASS INDEX: 42.66 KG/M2 | HEIGHT: 72 IN | HEART RATE: 82 BPM | WEIGHT: 315 LBS | OXYGEN SATURATION: 95 % | RESPIRATION RATE: 20 BRPM | SYSTOLIC BLOOD PRESSURE: 130 MMHG | TEMPERATURE: 98 F

## 2024-06-18 DIAGNOSIS — Z87.39 PERSONAL HISTORY OF OTHER DISEASES OF THE MUSCULOSKELETAL SYSTEM AND CONNECTIVE TISSUE: ICD-10-CM

## 2024-06-18 DIAGNOSIS — Z12.2 ENCOUNTER FOR SCREENING FOR MALIGNANT NEOPLASM OF RESPIRATORY ORGANS: ICD-10-CM

## 2024-06-18 DIAGNOSIS — Z78.9 OTHER SPECIFIED HEALTH STATUS: ICD-10-CM

## 2024-06-18 DIAGNOSIS — Z86.16 PERSONAL HISTORY OF COVID-19: ICD-10-CM

## 2024-06-18 DIAGNOSIS — Z87.438 PERSONAL HISTORY OF OTHER DISEASES OF MALE GENITAL ORGANS: ICD-10-CM

## 2024-06-18 DIAGNOSIS — U07.1 COVID-19: ICD-10-CM

## 2024-06-18 DIAGNOSIS — R79.89 OTHER SPECIFIED ABNORMAL FINDINGS OF BLOOD CHEMISTRY: ICD-10-CM

## 2024-06-18 DIAGNOSIS — Z82.49 FAMILY HISTORY OF ISCHEMIC HEART DISEASE AND OTHER DISEASES OF THE CIRCULATORY SYSTEM: ICD-10-CM

## 2024-06-18 DIAGNOSIS — Z57.9 OCCUPATIONAL EXPOSURE TO UNSPECIFIED RISK FACTOR: ICD-10-CM

## 2024-06-18 DIAGNOSIS — Z86.39 PERSONAL HISTORY OF OTHER ENDOCRINE, NUTRITIONAL AND METABOLIC DISEASE: ICD-10-CM

## 2024-06-18 DIAGNOSIS — R06.02 SHORTNESS OF BREATH: ICD-10-CM

## 2024-06-18 DIAGNOSIS — Z86.79 PERSONAL HISTORY OF OTHER DISEASES OF THE CIRCULATORY SYSTEM: ICD-10-CM

## 2024-06-18 DIAGNOSIS — J44.89 OTHER SPECIFIED CHRONIC OBSTRUCTIVE PULMONARY DISEASE: ICD-10-CM

## 2024-06-18 DIAGNOSIS — G47.33 OBSTRUCTIVE SLEEP APNEA (ADULT) (PEDIATRIC): ICD-10-CM

## 2024-06-18 DIAGNOSIS — Z87.891 PERSONAL HISTORY OF NICOTINE DEPENDENCE: ICD-10-CM

## 2024-06-18 PROCEDURE — ZZZZZ: CPT

## 2024-06-18 PROCEDURE — 94729 DIFFUSING CAPACITY: CPT

## 2024-06-18 PROCEDURE — 94727 GAS DIL/WSHOT DETER LNG VOL: CPT

## 2024-06-18 PROCEDURE — 94060 EVALUATION OF WHEEZING: CPT

## 2024-06-18 PROCEDURE — 95012 NITRIC OXIDE EXP GAS DETER: CPT

## 2024-06-18 PROCEDURE — 94618 PULMONARY STRESS TESTING: CPT

## 2024-06-18 PROCEDURE — 71046 X-RAY EXAM CHEST 2 VIEWS: CPT

## 2024-06-18 PROCEDURE — 99204 OFFICE O/P NEW MOD 45 MIN: CPT | Mod: 25

## 2024-06-18 RX ORDER — ALBUTEROL SULFATE AND BUDESONIDE 90; 80 UG/1; UG/1
90-80 AEROSOL, METERED RESPIRATORY (INHALATION)
Qty: 3 | Refills: 3 | Status: ACTIVE | COMMUNITY
Start: 2024-06-18 | End: 1900-01-01

## 2024-06-18 RX ORDER — FLUTICASONE FUROATE, UMECLIDINIUM BROMIDE AND VILANTEROL TRIFENATATE 200; 62.5; 25 UG/1; UG/1; UG/1
200-62.5-25 POWDER RESPIRATORY (INHALATION)
Qty: 3 | Refills: 1 | Status: ACTIVE | COMMUNITY
Start: 2024-06-18 | End: 1900-01-01

## 2024-06-18 SDOH — HEALTH STABILITY - PHYSICAL HEALTH: OCCUPATIONAL EXPOSURE TO UNSPECIFIED RISK FACTOR: Z57.9

## 2024-06-18 NOTE — PROCEDURE
[FreeTextEntry1] : CXR reveals mild cardiomegaly; no evidence of infiltrate or effusion, prominent pulmonary arteries   Full PFT reveals mild obstructive dysfunction; FEV1 was  2.77L which is 73% of predicted; mildly reduced lung volumes; normal diffusion at 34.5, which is 94% of predicted; normal flow volume loop. PFTs were performed to evaluate for SOB 40% improvement on BD at mid to low volumes   6 minute walk test reveals a low saturation of 93% with no evidence of dyspnea or fatigue; walked   326 meters   FENO was 24; a normal value being less than 25 Fractional exhaled nitric oxide (FENO) is regarded as a simple, noninvasive method for assessing eosinophilic airway inflammation. Produced by a variety of cells within the lung, nitric oxide (NO) concentrations are generally low in healthy individuals. However, high concentrations of NO appear to be involved in nonspecific host defense mechanisms and chronic inflammatory diseases such as asthma. The American Thoracic Society (ATS) therefore has recommended using FENO to aid in the diagnosis and monitoring of eosinophilic airway inflammation and asthma, and for identifying steroid responsive individuals whose chronic respiratory symptoms may be caused by airway inflammation.   The American Thoracic Society (ATS) strongly recommends the use of FeNO measurement to aid in the assessment, management, and long-term monitoring of asthma. In their 2011 clinical practice guideline, the ATS emphasizes the importance of using FeNO.

## 2024-06-18 NOTE — HISTORY OF PRESENT ILLNESS
[TextBox_4] : Mr. BALDWIN is a 53 year old male with a history of COVID 19 (2023), HTN, HLD, arthritis, overweight, former 20 pack year smoker, TERENCE on CPAP, BPH, low vitamin D, occupational exposure in the workplace, gout presenting to the office today for an initial pulmonary evaluation for SOB, asthma/COPD (former smoker and occupational exposure), TERENCE, lung cancer screening. His chief complaint is  -he notes frequently wheezing  -he notes SOB and MONTEMAYOR  -he notes losing weight (15 lbs) -he notes being on Mounjaro  -he notes wheezing  -he notes using trelegy for his wheezing that improved his Sx  -he notes taking trelegy as needed  -he denies allergies  -he denies previous bronchitis  -he notes his sense of smell is off  -he notes appetite is stable -he notes diet is good -he denies dysphonia -he notes vision is stable -he notes balance is stable -he notes planning on exercising soon  -he notes bowels are regular -he notes seeing Rebolledo for is gout  -he notes using his CPAP (FFM), Chalino  -he notes his CPAP being about 4 years old   -he denies any headaches, nausea, emesis, fever, chills, sweats, chest pain, chest pressure, coughing, palpitations, constipation, diarrhea, vertigo, dysphagia, heartburn, reflux, itchy eyes, itchy ears, leg swelling, leg pain, arthralgias, myalgias, or sour taste in the mouth.

## 2024-06-18 NOTE — REASON FOR VISIT
[Initial] : an initial visit [TextBox_44] :  SOB, asthma/COPD (former smoker and occupational exposure), TERENCE, lung cancer screening.

## 2024-06-18 NOTE — ASSESSMENT
[FreeTextEntry1] : Mr. BALDWIN is a 53 year old male with a history of COVID 19 (2023), HTN, HLD, arthritis, overweight, former 20 pack year smoker, TERENCE on CPAP, BPH, low vitamin D, occupational exposure in the workplace, gout presenting to the office today for an initial pulmonary evaluation for SOB, asthma/COPD (former smoker and occupational exposure), TERENCE, lung cancer screening.    His shortness of breath is multifactorial due to: -poor mechanics of breathing -out of shape -over weight -pulmonary disease   -Asthma/COPD -cardiac disease (Frost)   problem 1: Asthma/COPD -add Trelegy (200) 1 puff QD -add AirSupra 2 puffs, pre exercise -Asthma is believed to be caused by inherited (genetic) and environmental factor, but its exact cause is unknown. Asthma may be triggered by allergens, lung infections, or irritants in the air. Asthma triggers are different for each person. -COPD is a progressive disease and although it cant be cured, appropriate management can slow its progression, reduce frequency and severity of exacerbations, improve symptoms, and the patient's quality of life. Hospitalizations are the greatest contributor to the total COPD costs and account for up to 87% of total COPD related costs. Exacerbations are the main cause of admissions and subsequently account for the 40-75% of COPD costs. Inhaled maintenance therapy reduces the incidence of exacerbations in patients with stable COPD. Incorrect inhaler use and nonadherence are major obstacles to achieving COPD treatment goals. Many COPD patients have challenges (impaired inhalation, limited dexterity, reduced cognition) that limit their ability to correctly use their COPD treatment devices resulting in reduced symptom control. Of most importance is smoking cessation, early intervention with respiratory illnesses, and contemplation for pulmonary rehab to enhance quality of life. -Inhaler technique reviewed as well as oral hygiene techniques reviewed with patient. Avoidance of cold air, extremes of temperature, rescue inhaler should be used before exercise. Order of medication reviewed with patient. Recommended use of a cool mist humidifier in the bedroom.   Problem 2: lung cancer screening (former 20 pack year smoker and occupational exposure in the workplace)  -complete yearly CT of the chest  -Lung cancer remains the No. 1 cause of cancer-related mortality, comprising approximately 20% of cancer deaths. The incidence of lung cancer has been in decline for 3 decades, and this reduction became even more dramatic from 2014 to 2020. Unfortunately, 44% of incident cases of lung cancer are diagnosed at the stage of distant metastases, when the 5-year survival rate is approximately 7%. -The American Cancer Society now recommends that individuals aged 50 to 80 years who currently smoke, or formerly smoked, and are at high risk for lung cancer because of a = 20 pack-year history of cigarette smoking undergo annual lung cancer screening with LDCT. The recommendations eliminated years since quitting as a criterium for evaluation for lung cancer screening.   Problem 3: TERENCE (elevated Mallampati class, poor quality sleep, snoring, neck size >16) -on CPAP (FFM), Allred  -Sleep apnea is associated with adverse clinical consequences which can affect most organ systems. Cardiovascular disease risk includes arrhythmias, atrial fibrillation, hypertension, coronary artery disease, and stroke. Metabolic disorders include diabetes type 2, non-alcoholic fatty liver disease. Mood disorder especially depression; and cognitive decline especially in the elderly. Associations with chronic reflux/Barretts esophagus some but not all inclusive. -Reasons include arousal consistent with hypopnea; respiratory events most prominent in REM sleep or supine position; therefore sleep staging and body position are important for accurate diagnosis and estimation of AHI.   problem 4: cardiac disease -complete yearly ECHO  -recommended to continue to follow up with Cardiologist (Santosh)    problem 5: poor breathing mechanics -Proper breathing techniques were reviewed with an emphasis of exhalation. Patient instructed to breathe in for 1 second and out for 4 seconds. Patient was encouraged to not talk while walking.   problem 6: overweight/ out of shape -on Mounjaro  -Weight loss, exercise, and diet control were discussed and are highly encouraged. Treatment options are given such as, aqua therapy, and contacting a nutritionist. Recommended to use the elliptical, stationary bike, less use of treadmill.   problem 7: health maintenance -recommended yearly flu shot after October 15, 2023 -recommended strep pneumonia vaccines: Prevnar-20 vaccine, followed by Pneumo vaccine 23 one year following after 65 years old. -recommended early intervention for Upper Respiratory Infections (URIs) -recommended regular osteoporosis evaluations -recommended early dermatological evaluations -recommended after the age of 50 to the age of 70, colonoscopy every 5 years   F/P in 6-8 weeks. He is encouraged to call with any changes, concerns, or questions

## 2024-06-18 NOTE — ADDENDUM
[FreeTextEntry1] : Documented by Yo Guerrero acting as a scribe for Dr. Javier Morgan on 06/18/2024. All medical record entries made by the Scribe were at my, Dr. Javier Morgan's, direction and personally dictated by me on 06/18/2024. I have reviewed the chart and agree that the record accurately reflects my personal performance of the history, physical exam, assessment and plan. I have also personally directed, reviewed, and agree with the discharge instructions.

## 2024-06-18 NOTE — PHYSICAL EXAM
[No Acute Distress] : no acute distress [Normal Oropharynx] : normal oropharynx [III] : Mallampati Class: III [Normal Appearance] : normal appearance [No Neck Mass] : no neck mass [Normal Rate/Rhythm] : normal rate/rhythm [Normal S1, S2] : normal s1, s2 [No Murmurs] : no murmurs [No Resp Distress] : no resp distress [Clear to Auscultation Bilaterally] : clear to auscultation bilaterally [No Abnormalities] : no abnormalities [Benign] : benign [Normal Gait] : normal gait [No Clubbing] : no clubbing [No Cyanosis] : no cyanosis [No Edema] : no edema [FROM] : FROM [Normal Color/ Pigmentation] : normal color/ pigmentation [No Focal Deficits] : no focal deficits [Oriented x3] : oriented x3 [Normal Affect] : normal affect [TextBox_2] : OW [TextBox_68] : I:E ratio 1:3; mild expiratory wheezes

## 2024-06-23 ENCOUNTER — RX RENEWAL (OUTPATIENT)
Age: 54
End: 2024-06-23

## 2024-06-23 RX ORDER — COLCHICINE 0.6 MG/1
0.6 CAPSULE ORAL
Qty: 45 | Refills: 1 | Status: ACTIVE | COMMUNITY
Start: 2023-03-03 | End: 1900-01-01

## 2024-07-01 ENCOUNTER — NON-APPOINTMENT (OUTPATIENT)
Age: 54
End: 2024-07-01

## 2024-07-01 VITALS — HEIGHT: 72 IN | WEIGHT: 315 LBS | BODY MASS INDEX: 42.66 KG/M2

## 2024-07-01 DIAGNOSIS — Z87.891 PERSONAL HISTORY OF NICOTINE DEPENDENCE: ICD-10-CM

## 2024-07-16 ENCOUNTER — APPOINTMENT (OUTPATIENT)
Dept: CT IMAGING | Facility: CLINIC | Age: 54
End: 2024-07-16

## 2024-08-02 ENCOUNTER — NON-APPOINTMENT (OUTPATIENT)
Age: 54
End: 2024-08-02

## 2024-08-03 ENCOUNTER — APPOINTMENT (OUTPATIENT)
Dept: ULTRASOUND IMAGING | Facility: CLINIC | Age: 54
End: 2024-08-03
Payer: COMMERCIAL

## 2024-08-03 ENCOUNTER — OUTPATIENT (OUTPATIENT)
Dept: OUTPATIENT SERVICES | Facility: HOSPITAL | Age: 54
LOS: 1 days | End: 2024-08-03
Payer: COMMERCIAL

## 2024-08-03 DIAGNOSIS — Z98.89 OTHER SPECIFIED POSTPROCEDURAL STATES: Chronic | ICD-10-CM

## 2024-08-03 DIAGNOSIS — N50.89 OTHER SPECIFIED DISORDERS OF THE MALE GENITAL ORGANS: ICD-10-CM

## 2024-08-03 PROCEDURE — 76870 US EXAM SCROTUM: CPT | Mod: 26

## 2024-08-03 PROCEDURE — 76870 US EXAM SCROTUM: CPT

## 2024-08-07 ENCOUNTER — RX RENEWAL (OUTPATIENT)
Age: 54
End: 2024-08-07

## 2024-09-10 NOTE — BRIEF OPERATIVE NOTE - NSICDXBRIEFPREOP_GEN_ALL_CORE_FT
PRE-OP DIAGNOSIS:  Right carpal tunnel syndrome 07-May-2024 09:12:02  Brandon Purcell  
color consistent with ethnicity/race

## 2024-09-14 ENCOUNTER — RX RENEWAL (OUTPATIENT)
Age: 54
End: 2024-09-14

## 2024-09-27 ENCOUNTER — NON-APPOINTMENT (OUTPATIENT)
Age: 54
End: 2024-09-27

## 2024-09-27 DIAGNOSIS — L72.9 FOLLICULAR CYST OF THE SKIN AND SUBCUTANEOUS TISSUE, UNSPECIFIED: ICD-10-CM

## 2024-09-28 ENCOUNTER — APPOINTMENT (OUTPATIENT)
Dept: DERMATOLOGY | Facility: CLINIC | Age: 54
End: 2024-09-28
Payer: COMMERCIAL

## 2024-09-28 DIAGNOSIS — L30.8 OTHER SPECIFIED DERMATITIS: ICD-10-CM

## 2024-09-28 DIAGNOSIS — L20.81 ATOPIC NEURODERMATITIS: ICD-10-CM

## 2024-09-28 DIAGNOSIS — L29.9 PRURITUS, UNSPECIFIED: ICD-10-CM

## 2024-09-28 DIAGNOSIS — R23.4 CHANGES IN SKIN TEXTURE: ICD-10-CM

## 2024-09-28 PROCEDURE — 99203 OFFICE O/P NEW LOW 30 MIN: CPT

## 2024-09-28 NOTE — HISTORY OF PRESENT ILLNESS
[FreeTextEntry1] : rough skin on elbows and lower back. itchy trunk at times. For months. No treatment. [de-identified] : h/o dry skin. Moisturizes often but not daily. "Works well"

## 2024-09-28 NOTE — ASSESSMENT
[FreeTextEntry1] :   Alert, oriented, well pleasant.   Sun-exposed cutaneous exam. No evidence of cutaneous malignancy. Brown, yellow papules and plaques generalized. Seborrheic keratoses. No treatment. Actinic damage. Reviewed sun protection. Use SPF70 spray. No erythematous patches. Indicates itchy at times lateral abdomen.   Pruritus - start Claritin 1 each morning for itch. Continue for 2 months plus as needed. Thickened scaly non-erythematous plaques bilateral forearms. Speckled thickened keratotic papules lower mid back.   Thickened skin.   Atopic Dermatitis   Asteototic Dermatitis. Dry skin care reviewed.   Cetaphil cream to body daily.   Cetaphil rough and bumpy cream to forearms and lower back daily until cleared.  Follow up 1 year.

## 2024-10-08 ENCOUNTER — OUTPATIENT (OUTPATIENT)
Dept: OUTPATIENT SERVICES | Facility: HOSPITAL | Age: 54
LOS: 1 days | End: 2024-10-08
Payer: COMMERCIAL

## 2024-10-08 ENCOUNTER — APPOINTMENT (OUTPATIENT)
Dept: CT IMAGING | Facility: CLINIC | Age: 54
End: 2024-10-08

## 2024-10-08 DIAGNOSIS — Z12.2 ENCOUNTER FOR SCREENING FOR MALIGNANT NEOPLASM OF RESPIRATORY ORGANS: ICD-10-CM

## 2024-10-08 DIAGNOSIS — Z98.89 OTHER SPECIFIED POSTPROCEDURAL STATES: Chronic | ICD-10-CM

## 2024-10-08 DIAGNOSIS — Z00.8 ENCOUNTER FOR OTHER GENERAL EXAMINATION: ICD-10-CM

## 2024-10-08 PROCEDURE — 71271 CT THORAX LUNG CANCER SCR C-: CPT

## 2024-10-08 PROCEDURE — 71271 CT THORAX LUNG CANCER SCR C-: CPT | Mod: 26

## 2024-10-15 ENCOUNTER — APPOINTMENT (OUTPATIENT)
Dept: RHEUMATOLOGY | Facility: CLINIC | Age: 54
End: 2024-10-15

## 2024-10-15 VITALS
OXYGEN SATURATION: 97 % | RESPIRATION RATE: 18 BRPM | TEMPERATURE: 97.5 F | HEIGHT: 72 IN | SYSTOLIC BLOOD PRESSURE: 122 MMHG | BODY MASS INDEX: 42.66 KG/M2 | HEART RATE: 82 BPM | DIASTOLIC BLOOD PRESSURE: 78 MMHG | WEIGHT: 315 LBS

## 2024-10-15 DIAGNOSIS — M10.9 GOUT, UNSPECIFIED: ICD-10-CM

## 2024-10-15 PROCEDURE — 99214 OFFICE O/P EST MOD 30 MIN: CPT

## 2024-10-15 PROCEDURE — G2211 COMPLEX E/M VISIT ADD ON: CPT | Mod: NC

## 2024-10-25 ENCOUNTER — APPOINTMENT (OUTPATIENT)
Dept: PULMONOLOGY | Facility: CLINIC | Age: 54
End: 2024-10-25
Payer: COMMERCIAL

## 2024-10-25 VITALS
TEMPERATURE: 97.5 F | WEIGHT: 315 LBS | HEART RATE: 79 BPM | DIASTOLIC BLOOD PRESSURE: 80 MMHG | SYSTOLIC BLOOD PRESSURE: 130 MMHG | BODY MASS INDEX: 42.66 KG/M2 | HEIGHT: 72 IN | RESPIRATION RATE: 16 BRPM | OXYGEN SATURATION: 96 %

## 2024-10-25 DIAGNOSIS — R06.02 SHORTNESS OF BREATH: ICD-10-CM

## 2024-10-25 DIAGNOSIS — G47.33 OBSTRUCTIVE SLEEP APNEA (ADULT) (PEDIATRIC): ICD-10-CM

## 2024-10-25 DIAGNOSIS — R93.89 ABNORMAL FINDINGS ON DIAGNOSTIC IMAGING OF OTHER SPECIFIED BODY STRUCTURES: ICD-10-CM

## 2024-10-25 DIAGNOSIS — J44.89 OTHER SPECIFIED CHRONIC OBSTRUCTIVE PULMONARY DISEASE: ICD-10-CM

## 2024-10-25 PROCEDURE — 99214 OFFICE O/P EST MOD 30 MIN: CPT | Mod: 25

## 2024-10-25 PROCEDURE — 95012 NITRIC OXIDE EXP GAS DETER: CPT

## 2024-10-25 PROCEDURE — 94010 BREATHING CAPACITY TEST: CPT

## 2024-11-21 ENCOUNTER — NON-APPOINTMENT (OUTPATIENT)
Age: 54
End: 2024-11-21

## 2024-11-21 ENCOUNTER — APPOINTMENT (OUTPATIENT)
Dept: NEUROLOGY | Facility: CLINIC | Age: 54
End: 2024-11-21

## 2024-11-21 VITALS
OXYGEN SATURATION: 96 % | TEMPERATURE: 98.1 F | WEIGHT: 315 LBS | DIASTOLIC BLOOD PRESSURE: 88 MMHG | HEART RATE: 76 BPM | SYSTOLIC BLOOD PRESSURE: 135 MMHG | BODY MASS INDEX: 42.66 KG/M2 | HEIGHT: 72 IN

## 2024-11-21 DIAGNOSIS — G24.9 DYSTONIA, UNSPECIFIED: ICD-10-CM

## 2024-11-21 PROCEDURE — 99204 OFFICE O/P NEW MOD 45 MIN: CPT

## 2024-11-21 PROCEDURE — 99205 OFFICE O/P NEW HI 60 MIN: CPT

## 2024-12-06 ENCOUNTER — APPOINTMENT (OUTPATIENT)
Dept: MRI IMAGING | Facility: CLINIC | Age: 54
End: 2024-12-06

## 2024-12-06 ENCOUNTER — OUTPATIENT (OUTPATIENT)
Dept: OUTPATIENT SERVICES | Facility: HOSPITAL | Age: 54
LOS: 1 days | End: 2024-12-06
Payer: COMMERCIAL

## 2024-12-06 ENCOUNTER — APPOINTMENT (OUTPATIENT)
Dept: ULTRASOUND IMAGING | Facility: CLINIC | Age: 54
End: 2024-12-06

## 2024-12-06 DIAGNOSIS — Z98.89 OTHER SPECIFIED POSTPROCEDURAL STATES: Chronic | ICD-10-CM

## 2024-12-06 DIAGNOSIS — G24.9 DYSTONIA, UNSPECIFIED: ICD-10-CM

## 2024-12-06 DIAGNOSIS — N18.32 CHRONIC KIDNEY DISEASE, STAGE 3B: ICD-10-CM

## 2024-12-06 PROCEDURE — 76770 US EXAM ABDO BACK WALL COMP: CPT

## 2024-12-06 PROCEDURE — 76770 US EXAM ABDO BACK WALL COMP: CPT | Mod: 26

## 2024-12-09 ENCOUNTER — RX RENEWAL (OUTPATIENT)
Age: 54
End: 2024-12-09

## 2025-01-10 ENCOUNTER — APPOINTMENT (OUTPATIENT)
Dept: NEUROLOGY | Facility: CLINIC | Age: 55
End: 2025-01-10

## 2025-01-13 ENCOUNTER — RX RENEWAL (OUTPATIENT)
Age: 55
End: 2025-01-13

## 2025-02-18 ENCOUNTER — RX RENEWAL (OUTPATIENT)
Age: 55
End: 2025-02-18

## 2025-02-18 RX ORDER — COLCHICINE 0.6 MG/1
0.6 TABLET ORAL
Qty: 45 | Refills: 2 | Status: ACTIVE | COMMUNITY
Start: 2025-02-18 | End: 1900-01-01

## 2025-04-04 ENCOUNTER — NON-APPOINTMENT (OUTPATIENT)
Age: 55
End: 2025-04-04

## 2025-04-15 ENCOUNTER — APPOINTMENT (OUTPATIENT)
Dept: RHEUMATOLOGY | Facility: CLINIC | Age: 55
End: 2025-04-15
Payer: COMMERCIAL

## 2025-04-15 VITALS
WEIGHT: 315 LBS | HEART RATE: 84 BPM | OXYGEN SATURATION: 98 % | TEMPERATURE: 97.7 F | HEIGHT: 72 IN | SYSTOLIC BLOOD PRESSURE: 140 MMHG | DIASTOLIC BLOOD PRESSURE: 88 MMHG | BODY MASS INDEX: 42.66 KG/M2 | RESPIRATION RATE: 16 BRPM

## 2025-04-15 DIAGNOSIS — M65.941 UNSPECIFIED SYNOVITIS AND TENOSYNOVITIS, RIGHT HAND: ICD-10-CM

## 2025-04-15 DIAGNOSIS — M10.9 GOUT, UNSPECIFIED: ICD-10-CM

## 2025-04-15 PROCEDURE — G2211 COMPLEX E/M VISIT ADD ON: CPT | Mod: NC

## 2025-04-15 PROCEDURE — 99214 OFFICE O/P EST MOD 30 MIN: CPT

## 2025-04-15 RX ORDER — EMPAGLIFLOZIN 25 MG/1
TABLET, FILM COATED ORAL
Refills: 0 | Status: ACTIVE | COMMUNITY

## 2025-04-15 RX ORDER — LOSARTAN POTASSIUM 100 MG/1
TABLET, FILM COATED ORAL
Refills: 0 | Status: ACTIVE | COMMUNITY

## 2025-04-25 ENCOUNTER — APPOINTMENT (OUTPATIENT)
Dept: PULMONOLOGY | Facility: CLINIC | Age: 55
End: 2025-04-25
Payer: COMMERCIAL

## 2025-04-25 VITALS
OXYGEN SATURATION: 97 % | BODY MASS INDEX: 42.66 KG/M2 | HEART RATE: 77 BPM | SYSTOLIC BLOOD PRESSURE: 134 MMHG | WEIGHT: 315 LBS | RESPIRATION RATE: 16 BRPM | TEMPERATURE: 97.9 F | DIASTOLIC BLOOD PRESSURE: 80 MMHG | HEIGHT: 72 IN

## 2025-04-25 DIAGNOSIS — Z57.9 OCCUPATIONAL EXPOSURE TO UNSPECIFIED RISK FACTOR: ICD-10-CM

## 2025-04-25 DIAGNOSIS — G47.33 OBSTRUCTIVE SLEEP APNEA (ADULT) (PEDIATRIC): ICD-10-CM

## 2025-04-25 DIAGNOSIS — J44.89 OTHER SPECIFIED CHRONIC OBSTRUCTIVE PULMONARY DISEASE: ICD-10-CM

## 2025-04-25 DIAGNOSIS — R06.02 SHORTNESS OF BREATH: ICD-10-CM

## 2025-04-25 DIAGNOSIS — E55.9 VITAMIN D DEFICIENCY, UNSPECIFIED: ICD-10-CM

## 2025-04-25 DIAGNOSIS — R93.89 ABNORMAL FINDINGS ON DIAGNOSTIC IMAGING OF OTHER SPECIFIED BODY STRUCTURES: ICD-10-CM

## 2025-04-25 PROCEDURE — 94727 GAS DIL/WSHOT DETER LNG VOL: CPT

## 2025-04-25 PROCEDURE — 94010 BREATHING CAPACITY TEST: CPT

## 2025-04-25 PROCEDURE — ZZZZZ: CPT

## 2025-04-25 PROCEDURE — 95012 NITRIC OXIDE EXP GAS DETER: CPT

## 2025-04-25 PROCEDURE — 94729 DIFFUSING CAPACITY: CPT

## 2025-04-25 PROCEDURE — 99214 OFFICE O/P EST MOD 30 MIN: CPT | Mod: 25

## 2025-04-25 SDOH — HEALTH STABILITY - PHYSICAL HEALTH: OCCUPATIONAL EXPOSURE TO UNSPECIFIED RISK FACTOR: Z57.9

## 2025-06-11 ENCOUNTER — RX RENEWAL (OUTPATIENT)
Age: 55
End: 2025-06-11

## 2025-06-23 NOTE — PATIENT PROFILE ADULT - NSPROGENPREVTRANSF_GEN_A_NUR
Oriented - self; Oriented - place; Oriented - time/Age appropriate behavior no history of blood product transfusion

## 2025-08-01 ENCOUNTER — RX RENEWAL (OUTPATIENT)
Age: 55
End: 2025-08-01

## 2025-08-22 ENCOUNTER — APPOINTMENT (OUTPATIENT)
Dept: PULMONOLOGY | Facility: CLINIC | Age: 55
End: 2025-08-22
Payer: COMMERCIAL

## 2025-08-22 VITALS
SYSTOLIC BLOOD PRESSURE: 110 MMHG | RESPIRATION RATE: 16 BRPM | WEIGHT: 315 LBS | HEIGHT: 72 IN | OXYGEN SATURATION: 97 % | DIASTOLIC BLOOD PRESSURE: 76 MMHG | BODY MASS INDEX: 42.66 KG/M2 | TEMPERATURE: 96.8 F | HEART RATE: 75 BPM

## 2025-08-22 DIAGNOSIS — J44.89 OTHER SPECIFIED CHRONIC OBSTRUCTIVE PULMONARY DISEASE: ICD-10-CM

## 2025-08-22 DIAGNOSIS — Z01.818 ENCOUNTER FOR OTHER PREPROCEDURAL EXAMINATION: ICD-10-CM

## 2025-08-22 DIAGNOSIS — G47.33 OBSTRUCTIVE SLEEP APNEA (ADULT) (PEDIATRIC): ICD-10-CM

## 2025-08-22 DIAGNOSIS — Z57.9 OCCUPATIONAL EXPOSURE TO UNSPECIFIED RISK FACTOR: ICD-10-CM

## 2025-08-22 PROCEDURE — 94727 GAS DIL/WSHOT DETER LNG VOL: CPT

## 2025-08-22 PROCEDURE — 95012 NITRIC OXIDE EXP GAS DETER: CPT

## 2025-08-22 PROCEDURE — 94010 BREATHING CAPACITY TEST: CPT

## 2025-08-22 PROCEDURE — 94729 DIFFUSING CAPACITY: CPT

## 2025-08-22 PROCEDURE — 99214 OFFICE O/P EST MOD 30 MIN: CPT | Mod: 25

## 2025-08-22 SDOH — HEALTH STABILITY - PHYSICAL HEALTH: OCCUPATIONAL EXPOSURE TO UNSPECIFIED RISK FACTOR: Z57.9

## 2025-09-08 ENCOUNTER — APPOINTMENT (OUTPATIENT)
Dept: PULMONOLOGY | Facility: CLINIC | Age: 55
End: 2025-09-08

## (undated) DEVICE — PLV-SCD MACHINE: Type: DURABLE MEDICAL EQUIPMENT

## (undated) DEVICE — DRSG XEROFORM 1 X 8"

## (undated) DEVICE — DRAPE LIGHT HANDLE COVER (GREEN)

## (undated) DEVICE — PREP BETADINE KIT

## (undated) DEVICE — VENODYNE/SCD SLEEVE CALF LARGE

## (undated) DEVICE — WARMING BLANKET LOWER ADULT

## (undated) DEVICE — NDL HYPO SAFE 25G X 5/8" (ORANGE)

## (undated) DEVICE — DRSG CURITY GAUZE SPONGE 4 X 4" 12-PLY

## (undated) DEVICE — PACK HAND

## (undated) DEVICE — BLADE SCALPEL SAFETYLOCK #15

## (undated) DEVICE — TOURNIQUET CUFF 18" DUAL PORT SINGLE BLADDER LUER LOCK (BLACK)

## (undated) DEVICE — DRSG KLING 3"

## (undated) DEVICE — ELCTR BIPOLAR CORD J&J 12FT DISP

## (undated) DEVICE — GOWN XL W TOWEL

## (undated) DEVICE — SOL IRR POUR NS 0.9% 1000ML

## (undated) DEVICE — DRSG WEBRIL 4"

## (undated) DEVICE — DRAPE INSTRUMENT POUCH 6.75" X 11"

## (undated) DEVICE — DRAPE TOWEL BLUE 17" X 24"

## (undated) DEVICE — GLV 8 PROTEXIS (WHITE)

## (undated) DEVICE — VENODYNE/SCD SLEEVE CALF MEDIUM

## (undated) DEVICE — SYR LUER LOK 30CC

## (undated) DEVICE — DRSG STOCKINETTE IMPERVIOUS LG

## (undated) DEVICE — PLV/PSP-TOURNIQUET #10 402009190016: Type: DURABLE MEDICAL EQUIPMENT

## (undated) DEVICE — DRAPE U POLY BLUE 60"X60"

## (undated) DEVICE — DRAPE 3/4 SHEET W REINFORCEMENT 56X77"